# Patient Record
Sex: FEMALE | Race: WHITE | NOT HISPANIC OR LATINO | Employment: FULL TIME | URBAN - METROPOLITAN AREA
[De-identification: names, ages, dates, MRNs, and addresses within clinical notes are randomized per-mention and may not be internally consistent; named-entity substitution may affect disease eponyms.]

---

## 2021-03-15 DIAGNOSIS — Z23 NEED FOR VACCINATION: ICD-10-CM

## 2021-03-23 ENCOUNTER — HOSPITAL ENCOUNTER (OUTPATIENT)
Dept: LAB | Facility: MEDICAL CENTER | Age: 61
End: 2021-03-23
Attending: NURSE PRACTITIONER
Payer: COMMERCIAL

## 2021-03-23 ENCOUNTER — OFFICE VISIT (OUTPATIENT)
Dept: URGENT CARE | Facility: PHYSICIAN GROUP | Age: 61
End: 2021-03-23
Payer: COMMERCIAL

## 2021-03-23 ENCOUNTER — HOSPITAL ENCOUNTER (INPATIENT)
Facility: MEDICAL CENTER | Age: 61
LOS: 4 days | DRG: 897 | End: 2021-03-27
Attending: EMERGENCY MEDICINE | Admitting: HOSPITALIST
Payer: COMMERCIAL

## 2021-03-23 VITALS
RESPIRATION RATE: 18 BRPM | SYSTOLIC BLOOD PRESSURE: 116 MMHG | HEIGHT: 62 IN | DIASTOLIC BLOOD PRESSURE: 78 MMHG | WEIGHT: 135 LBS | BODY MASS INDEX: 24.84 KG/M2 | TEMPERATURE: 97.8 F | HEART RATE: 88 BPM | OXYGEN SATURATION: 93 %

## 2021-03-23 DIAGNOSIS — R11.0 NAUSEA: ICD-10-CM

## 2021-03-23 DIAGNOSIS — F10.931 ALCOHOL WITHDRAWAL DELIRIUM (HCC): ICD-10-CM

## 2021-03-23 DIAGNOSIS — K75.9 HEPATITIS: ICD-10-CM

## 2021-03-23 DIAGNOSIS — R10.13 EPIGASTRIC PAIN: ICD-10-CM

## 2021-03-23 DIAGNOSIS — R63.0 ANOREXIA: ICD-10-CM

## 2021-03-23 DIAGNOSIS — R19.7 DIARRHEA, UNSPECIFIED TYPE: ICD-10-CM

## 2021-03-23 DIAGNOSIS — R11.14 BILIOUS VOMITING WITH NAUSEA: ICD-10-CM

## 2021-03-23 DIAGNOSIS — R63.4 WEIGHT LOSS: ICD-10-CM

## 2021-03-23 DIAGNOSIS — D69.6 THROMBOCYTOPENIA (HCC): ICD-10-CM

## 2021-03-23 DIAGNOSIS — E87.1 HYPONATREMIA: ICD-10-CM

## 2021-03-23 DIAGNOSIS — R56.9 SEIZURE (HCC): ICD-10-CM

## 2021-03-23 PROBLEM — F10.10 ALCOHOL ABUSE: Status: ACTIVE | Noted: 2021-03-23

## 2021-03-23 PROBLEM — F10.930 SEIZURE DUE TO ALCOHOL WITHDRAWAL, UNCOMPLICATED (HCC): Status: ACTIVE | Noted: 2021-03-23

## 2021-03-23 PROBLEM — R11.2 NAUSEA VOMITING AND DIARRHEA: Status: ACTIVE | Noted: 2021-03-23

## 2021-03-23 LAB
ALBUMIN SERPL BCP-MCNC: 4 G/DL (ref 3.2–4.9)
ALBUMIN SERPL BCP-MCNC: 4.4 G/DL (ref 3.2–4.9)
ALBUMIN/GLOB SERPL: 1.2 G/DL
ALBUMIN/GLOB SERPL: 1.4 G/DL
ALP SERPL-CCNC: 113 U/L (ref 30–99)
ALP SERPL-CCNC: 119 U/L (ref 30–99)
ALT SERPL-CCNC: 65 U/L (ref 2–50)
ALT SERPL-CCNC: 66 U/L (ref 2–50)
AMYLASE SERPL-CCNC: 22 U/L (ref 20–103)
ANION GAP SERPL CALC-SCNC: 13 MMOL/L (ref 7–16)
ANION GAP SERPL CALC-SCNC: 21 MMOL/L (ref 7–16)
APTT PPP: 25.6 SEC (ref 24.7–36)
AST SERPL-CCNC: 153 U/L (ref 12–45)
AST SERPL-CCNC: 155 U/L (ref 12–45)
BASOPHILS # BLD AUTO: 0.5 % (ref 0–1.8)
BASOPHILS # BLD AUTO: 0.6 % (ref 0–1.8)
BASOPHILS # BLD: 0.02 K/UL (ref 0–0.12)
BASOPHILS # BLD: 0.02 K/UL (ref 0–0.12)
BILIRUB SERPL-MCNC: 2.3 MG/DL (ref 0.1–1.5)
BILIRUB SERPL-MCNC: 2.3 MG/DL (ref 0.1–1.5)
BUN SERPL-MCNC: 6 MG/DL (ref 8–22)
BUN SERPL-MCNC: 6 MG/DL (ref 8–22)
CALCIUM SERPL-MCNC: 9.2 MG/DL (ref 8.5–10.5)
CALCIUM SERPL-MCNC: 9.7 MG/DL (ref 8.5–10.5)
CHLORIDE SERPL-SCNC: 91 MMOL/L (ref 96–112)
CHLORIDE SERPL-SCNC: 92 MMOL/L (ref 96–112)
CO2 SERPL-SCNC: 22 MMOL/L (ref 20–33)
CO2 SERPL-SCNC: 28 MMOL/L (ref 20–33)
CREAT SERPL-MCNC: 0.49 MG/DL (ref 0.5–1.4)
CREAT SERPL-MCNC: 0.51 MG/DL (ref 0.5–1.4)
EOSINOPHIL # BLD AUTO: 0.04 K/UL (ref 0–0.51)
EOSINOPHIL # BLD AUTO: 0.04 K/UL (ref 0–0.51)
EOSINOPHIL NFR BLD: 0.9 % (ref 0–6.9)
EOSINOPHIL NFR BLD: 1.2 % (ref 0–6.9)
ERYTHROCYTE [DISTWIDTH] IN BLOOD BY AUTOMATED COUNT: 52.2 FL (ref 35.9–50)
ERYTHROCYTE [DISTWIDTH] IN BLOOD BY AUTOMATED COUNT: 52.5 FL (ref 35.9–50)
ETHANOL BLD-MCNC: <10.1 MG/DL (ref 0–10)
GLOBULIN SER CALC-MCNC: 3.1 G/DL (ref 1.9–3.5)
GLOBULIN SER CALC-MCNC: 3.3 G/DL (ref 1.9–3.5)
GLUCOSE BLD-MCNC: 105 MG/DL (ref 65–99)
GLUCOSE SERPL-MCNC: 115 MG/DL (ref 65–99)
GLUCOSE SERPL-MCNC: 91 MG/DL (ref 65–99)
HCT VFR BLD AUTO: 44.7 % (ref 37–47)
HCT VFR BLD AUTO: 45.9 % (ref 37–47)
HGB BLD-MCNC: 15 G/DL (ref 12–16)
HGB BLD-MCNC: 15.4 G/DL (ref 12–16)
IMM GRANULOCYTES # BLD AUTO: 0.01 K/UL (ref 0–0.11)
IMM GRANULOCYTES # BLD AUTO: 0.01 K/UL (ref 0–0.11)
IMM GRANULOCYTES NFR BLD AUTO: 0.2 % (ref 0–0.9)
IMM GRANULOCYTES NFR BLD AUTO: 0.3 % (ref 0–0.9)
INR PPP: 1.03 (ref 0.87–1.13)
LIPASE SERPL-CCNC: 19 U/L (ref 11–82)
LIPASE SERPL-CCNC: 19 U/L (ref 11–82)
LYMPHOCYTES # BLD AUTO: 0.54 K/UL (ref 1–4.8)
LYMPHOCYTES # BLD AUTO: 0.89 K/UL (ref 1–4.8)
LYMPHOCYTES NFR BLD: 16.4 % (ref 22–41)
LYMPHOCYTES NFR BLD: 20.5 % (ref 22–41)
MCH RBC QN AUTO: 37.2 PG (ref 27–33)
MCH RBC QN AUTO: 37.8 PG (ref 27–33)
MCHC RBC AUTO-ENTMCNC: 33.6 G/DL (ref 33.6–35)
MCHC RBC AUTO-ENTMCNC: 33.6 G/DL (ref 33.6–35)
MCV RBC AUTO: 110.9 FL (ref 81.4–97.8)
MCV RBC AUTO: 112.6 FL (ref 81.4–97.8)
MONOCYTES # BLD AUTO: 0.4 K/UL (ref 0–0.85)
MONOCYTES # BLD AUTO: 0.49 K/UL (ref 0–0.85)
MONOCYTES NFR BLD AUTO: 11.3 % (ref 0–13.4)
MONOCYTES NFR BLD AUTO: 12.1 % (ref 0–13.4)
NEUTROPHILS # BLD AUTO: 2.29 K/UL (ref 2–7.15)
NEUTROPHILS # BLD AUTO: 2.89 K/UL (ref 2–7.15)
NEUTROPHILS NFR BLD: 66.6 % (ref 44–72)
NEUTROPHILS NFR BLD: 69.4 % (ref 44–72)
NRBC # BLD AUTO: 0 K/UL
NRBC # BLD AUTO: 0 K/UL
NRBC BLD-RTO: 0 /100 WBC
NRBC BLD-RTO: 0 /100 WBC
PLATELET # BLD AUTO: 89 K/UL (ref 164–446)
PLATELET # BLD AUTO: 90 K/UL (ref 164–446)
PMV BLD AUTO: 9.7 FL (ref 9–12.9)
PMV BLD AUTO: 9.8 FL (ref 9–12.9)
POTASSIUM SERPL-SCNC: 3.7 MMOL/L (ref 3.6–5.5)
POTASSIUM SERPL-SCNC: 3.8 MMOL/L (ref 3.6–5.5)
PROT SERPL-MCNC: 7.3 G/DL (ref 6–8.2)
PROT SERPL-MCNC: 7.5 G/DL (ref 6–8.2)
PROTHROMBIN TIME: 13.9 SEC (ref 12–14.6)
RBC # BLD AUTO: 3.97 M/UL (ref 4.2–5.4)
RBC # BLD AUTO: 4.14 M/UL (ref 4.2–5.4)
SARS-COV-2 RNA RESP QL NAA+PROBE: NOTDETECTED
SODIUM SERPL-SCNC: 132 MMOL/L (ref 135–145)
SODIUM SERPL-SCNC: 135 MMOL/L (ref 135–145)
SPECIMEN SOURCE: NORMAL
T4 FREE SERPL-MCNC: 1.01 NG/DL (ref 0.93–1.7)
TSH SERPL DL<=0.005 MIU/L-ACNC: 9.67 UIU/ML (ref 0.38–5.33)
WBC # BLD AUTO: 3.3 K/UL (ref 4.8–10.8)
WBC # BLD AUTO: 4.3 K/UL (ref 4.8–10.8)

## 2021-03-23 PROCEDURE — 700101 HCHG RX REV CODE 250: Performed by: HOSPITALIST

## 2021-03-23 PROCEDURE — 83690 ASSAY OF LIPASE: CPT

## 2021-03-23 PROCEDURE — 84443 ASSAY THYROID STIM HORMONE: CPT

## 2021-03-23 PROCEDURE — 80053 COMPREHEN METABOLIC PANEL: CPT

## 2021-03-23 PROCEDURE — 99223 1ST HOSP IP/OBS HIGH 75: CPT | Performed by: HOSPITALIST

## 2021-03-23 PROCEDURE — 700111 HCHG RX REV CODE 636 W/ 250 OVERRIDE (IP): Performed by: EMERGENCY MEDICINE

## 2021-03-23 PROCEDURE — 82746 ASSAY OF FOLIC ACID SERUM: CPT

## 2021-03-23 PROCEDURE — 80053 COMPREHEN METABOLIC PANEL: CPT | Mod: 91

## 2021-03-23 PROCEDURE — 36415 COLL VENOUS BLD VENIPUNCTURE: CPT

## 2021-03-23 PROCEDURE — 99285 EMERGENCY DEPT VISIT HI MDM: CPT

## 2021-03-23 PROCEDURE — 85025 COMPLETE CBC W/AUTO DIFF WBC: CPT

## 2021-03-23 PROCEDURE — 85025 COMPLETE CBC W/AUTO DIFF WBC: CPT | Mod: 91

## 2021-03-23 PROCEDURE — 96368 THER/DIAG CONCURRENT INF: CPT

## 2021-03-23 PROCEDURE — 96365 THER/PROPH/DIAG IV INF INIT: CPT

## 2021-03-23 PROCEDURE — 700105 HCHG RX REV CODE 258: Performed by: EMERGENCY MEDICINE

## 2021-03-23 PROCEDURE — 99215 OFFICE O/P EST HI 40 MIN: CPT | Performed by: NURSE PRACTITIONER

## 2021-03-23 PROCEDURE — 85610 PROTHROMBIN TIME: CPT

## 2021-03-23 PROCEDURE — 83690 ASSAY OF LIPASE: CPT | Mod: 91

## 2021-03-23 PROCEDURE — 770006 HCHG ROOM/CARE - MED/SURG/GYN SEMI*

## 2021-03-23 PROCEDURE — 84439 ASSAY OF FREE THYROXINE: CPT

## 2021-03-23 PROCEDURE — 82607 VITAMIN B-12: CPT

## 2021-03-23 PROCEDURE — 700102 HCHG RX REV CODE 250 W/ 637 OVERRIDE(OP): Performed by: HOSPITALIST

## 2021-03-23 PROCEDURE — 82150 ASSAY OF AMYLASE: CPT

## 2021-03-23 PROCEDURE — U0005 INFEC AGEN DETEC AMPLI PROBE: HCPCS

## 2021-03-23 PROCEDURE — A9270 NON-COVERED ITEM OR SERVICE: HCPCS | Performed by: HOSPITALIST

## 2021-03-23 PROCEDURE — 700105 HCHG RX REV CODE 258: Performed by: HOSPITALIST

## 2021-03-23 PROCEDURE — 85730 THROMBOPLASTIN TIME PARTIAL: CPT

## 2021-03-23 PROCEDURE — 96372 THER/PROPH/DIAG INJ SC/IM: CPT

## 2021-03-23 PROCEDURE — 82962 GLUCOSE BLOOD TEST: CPT

## 2021-03-23 PROCEDURE — U0003 INFECTIOUS AGENT DETECTION BY NUCLEIC ACID (DNA OR RNA); SEVERE ACUTE RESPIRATORY SYNDROME CORONAVIRUS 2 (SARS-COV-2) (CORONAVIRUS DISEASE [COVID-19]), AMPLIFIED PROBE TECHNIQUE, MAKING USE OF HIGH THROUGHPUT TECHNOLOGIES AS DESCRIBED BY CMS-2020-01-R: HCPCS

## 2021-03-23 PROCEDURE — 700111 HCHG RX REV CODE 636 W/ 250 OVERRIDE (IP): Performed by: HOSPITALIST

## 2021-03-23 PROCEDURE — 80307 DRUG TEST PRSMV CHEM ANLYZR: CPT

## 2021-03-23 PROCEDURE — 82077 ASSAY SPEC XCP UR&BREATH IA: CPT

## 2021-03-23 PROCEDURE — 96375 TX/PRO/DX INJ NEW DRUG ADDON: CPT

## 2021-03-23 RX ORDER — LORAZEPAM 2 MG/ML
0.5 INJECTION INTRAMUSCULAR EVERY 4 HOURS PRN
Status: DISCONTINUED | OUTPATIENT
Start: 2021-03-23 | End: 2021-03-27 | Stop reason: HOSPADM

## 2021-03-23 RX ORDER — SODIUM CHLORIDE, SODIUM LACTATE, POTASSIUM CHLORIDE, CALCIUM CHLORIDE 600; 310; 30; 20 MG/100ML; MG/100ML; MG/100ML; MG/100ML
2000 INJECTION, SOLUTION INTRAVENOUS ONCE
Status: COMPLETED | OUTPATIENT
Start: 2021-03-23 | End: 2021-03-23

## 2021-03-23 RX ORDER — LORAZEPAM 0.5 MG/1
0.5 TABLET ORAL EVERY 4 HOURS PRN
Status: DISCONTINUED | OUTPATIENT
Start: 2021-03-23 | End: 2021-03-27 | Stop reason: HOSPADM

## 2021-03-23 RX ORDER — LORAZEPAM 2 MG/ML
2 INJECTION INTRAMUSCULAR
Status: DISCONTINUED | OUTPATIENT
Start: 2021-03-23 | End: 2021-03-27 | Stop reason: HOSPADM

## 2021-03-23 RX ORDER — LORAZEPAM 2 MG/1
2 TABLET ORAL
Status: DISCONTINUED | OUTPATIENT
Start: 2021-03-23 | End: 2021-03-27 | Stop reason: HOSPADM

## 2021-03-23 RX ORDER — POLYETHYLENE GLYCOL 3350 17 G/17G
1 POWDER, FOR SOLUTION ORAL
Status: DISCONTINUED | OUTPATIENT
Start: 2021-03-23 | End: 2021-03-27 | Stop reason: HOSPADM

## 2021-03-23 RX ORDER — LORAZEPAM 2 MG/ML
1.5 INJECTION INTRAMUSCULAR
Status: DISCONTINUED | OUTPATIENT
Start: 2021-03-23 | End: 2021-03-27 | Stop reason: HOSPADM

## 2021-03-23 RX ORDER — AMOXICILLIN 250 MG
2 CAPSULE ORAL 2 TIMES DAILY
Status: DISCONTINUED | OUTPATIENT
Start: 2021-03-23 | End: 2021-03-27 | Stop reason: HOSPADM

## 2021-03-23 RX ORDER — PROMETHAZINE HYDROCHLORIDE 25 MG/1
12.5-25 TABLET ORAL EVERY 4 HOURS PRN
Status: DISCONTINUED | OUTPATIENT
Start: 2021-03-23 | End: 2021-03-27 | Stop reason: HOSPADM

## 2021-03-23 RX ORDER — ONDANSETRON 4 MG/1
4 TABLET, ORALLY DISINTEGRATING ORAL EVERY 6 HOURS PRN
Qty: 12 TABLET | Refills: 0 | Status: ON HOLD | OUTPATIENT
Start: 2021-03-23 | End: 2021-03-27

## 2021-03-23 RX ORDER — CHLORDIAZEPOXIDE HYDROCHLORIDE 25 MG/1
25 CAPSULE, GELATIN COATED ORAL EVERY 8 HOURS
Status: COMPLETED | OUTPATIENT
Start: 2021-03-23 | End: 2021-03-24

## 2021-03-23 RX ORDER — PROCHLORPERAZINE EDISYLATE 5 MG/ML
5-10 INJECTION INTRAMUSCULAR; INTRAVENOUS EVERY 4 HOURS PRN
Status: DISCONTINUED | OUTPATIENT
Start: 2021-03-23 | End: 2021-03-27 | Stop reason: HOSPADM

## 2021-03-23 RX ORDER — ONDANSETRON 4 MG/1
4 TABLET, ORALLY DISINTEGRATING ORAL EVERY 4 HOURS PRN
Status: DISCONTINUED | OUTPATIENT
Start: 2021-03-23 | End: 2021-03-27 | Stop reason: HOSPADM

## 2021-03-23 RX ORDER — ONDANSETRON 2 MG/ML
4 INJECTION INTRAMUSCULAR; INTRAVENOUS EVERY 4 HOURS PRN
Status: DISCONTINUED | OUTPATIENT
Start: 2021-03-23 | End: 2021-03-27 | Stop reason: HOSPADM

## 2021-03-23 RX ORDER — LORAZEPAM 1 MG/1
1 TABLET ORAL EVERY 4 HOURS PRN
Status: DISCONTINUED | OUTPATIENT
Start: 2021-03-23 | End: 2021-03-27 | Stop reason: HOSPADM

## 2021-03-23 RX ORDER — LORAZEPAM 2 MG/ML
1 INJECTION INTRAMUSCULAR ONCE
Status: COMPLETED | OUTPATIENT
Start: 2021-03-23 | End: 2021-03-23

## 2021-03-23 RX ORDER — CLONIDINE HYDROCHLORIDE 0.1 MG/1
0.1 TABLET ORAL EVERY 6 HOURS PRN
Status: DISCONTINUED | OUTPATIENT
Start: 2021-03-23 | End: 2021-03-27 | Stop reason: HOSPADM

## 2021-03-23 RX ORDER — GAUZE BANDAGE 2" X 2"
100 BANDAGE TOPICAL DAILY
Status: COMPLETED | OUTPATIENT
Start: 2021-03-24 | End: 2021-03-27

## 2021-03-23 RX ORDER — FOLIC ACID 1 MG/1
1 TABLET ORAL DAILY
Status: COMPLETED | OUTPATIENT
Start: 2021-03-24 | End: 2021-03-27

## 2021-03-23 RX ORDER — SODIUM CHLORIDE 9 MG/ML
INJECTION, SOLUTION INTRAVENOUS CONTINUOUS
Status: DISCONTINUED | OUTPATIENT
Start: 2021-03-23 | End: 2021-03-27 | Stop reason: HOSPADM

## 2021-03-23 RX ORDER — POTASSIUM CHLORIDE 20 MEQ/1
20 TABLET, EXTENDED RELEASE ORAL 2 TIMES DAILY
Status: DISCONTINUED | OUTPATIENT
Start: 2021-03-23 | End: 2021-03-27 | Stop reason: HOSPADM

## 2021-03-23 RX ORDER — ACETAMINOPHEN 325 MG/1
650 TABLET ORAL EVERY 6 HOURS PRN
Status: DISCONTINUED | OUTPATIENT
Start: 2021-03-23 | End: 2021-03-27 | Stop reason: HOSPADM

## 2021-03-23 RX ORDER — PROMETHAZINE HYDROCHLORIDE 25 MG/1
12.5-25 SUPPOSITORY RECTAL EVERY 4 HOURS PRN
Status: DISCONTINUED | OUTPATIENT
Start: 2021-03-23 | End: 2021-03-27 | Stop reason: HOSPADM

## 2021-03-23 RX ORDER — LORAZEPAM 2 MG/ML
1 INJECTION INTRAMUSCULAR
Status: DISCONTINUED | OUTPATIENT
Start: 2021-03-23 | End: 2021-03-27 | Stop reason: HOSPADM

## 2021-03-23 RX ORDER — BISACODYL 10 MG
10 SUPPOSITORY, RECTAL RECTAL
Status: DISCONTINUED | OUTPATIENT
Start: 2021-03-23 | End: 2021-03-27 | Stop reason: HOSPADM

## 2021-03-23 RX ORDER — ONDANSETRON 4 MG/1
4 TABLET, ORALLY DISINTEGRATING ORAL ONCE
Status: COMPLETED | OUTPATIENT
Start: 2021-03-23 | End: 2021-03-23

## 2021-03-23 RX ORDER — LABETALOL HYDROCHLORIDE 5 MG/ML
10 INJECTION, SOLUTION INTRAVENOUS EVERY 4 HOURS PRN
Status: DISCONTINUED | OUTPATIENT
Start: 2021-03-23 | End: 2021-03-27 | Stop reason: HOSPADM

## 2021-03-23 RX ORDER — LORAZEPAM 2 MG/1
4 TABLET ORAL
Status: DISCONTINUED | OUTPATIENT
Start: 2021-03-23 | End: 2021-03-27 | Stop reason: HOSPADM

## 2021-03-23 RX ORDER — MAGNESIUM SULFATE HEPTAHYDRATE 40 MG/ML
2 INJECTION, SOLUTION INTRAVENOUS ONCE
Status: COMPLETED | OUTPATIENT
Start: 2021-03-23 | End: 2021-03-23

## 2021-03-23 RX ADMIN — THIAMINE HYDROCHLORIDE: 100 INJECTION, SOLUTION INTRAMUSCULAR; INTRAVENOUS at 17:52

## 2021-03-23 RX ADMIN — CHLORDIAZEPOXIDE HYDROCHLORIDE 25 MG: 25 CAPSULE ORAL at 17:30

## 2021-03-23 RX ADMIN — LORAZEPAM 1 MG: 2 INJECTION INTRAMUSCULAR; INTRAVENOUS at 15:43

## 2021-03-23 RX ADMIN — MAGNESIUM SULFATE 2 G: 2 INJECTION INTRAVENOUS at 17:53

## 2021-03-23 RX ADMIN — ENOXAPARIN SODIUM 40 MG: 40 INJECTION SUBCUTANEOUS at 17:30

## 2021-03-23 RX ADMIN — SODIUM CHLORIDE, POTASSIUM CHLORIDE, SODIUM LACTATE AND CALCIUM CHLORIDE 2000 ML: 600; 310; 30; 20 INJECTION, SOLUTION INTRAVENOUS at 23:00

## 2021-03-23 RX ADMIN — POTASSIUM CHLORIDE 20 MEQ: 1500 TABLET, EXTENDED RELEASE ORAL at 17:51

## 2021-03-23 RX ADMIN — ONDANSETRON 4 MG: 4 TABLET, ORALLY DISINTEGRATING ORAL at 10:15

## 2021-03-23 RX ADMIN — SODIUM CHLORIDE: 9 INJECTION, SOLUTION INTRAVENOUS at 15:48

## 2021-03-23 ASSESSMENT — ENCOUNTER SYMPTOMS
NAUSEA: 1
SORE THROAT: 0
BLOOD IN STOOL: 0
ABDOMINAL PAIN: 1
FEVER: 0
SHORTNESS OF BREATH: 0
NERVOUS/ANXIOUS: 0
FATIGUE: 1
SENSORY CHANGE: 0
DIZZINESS: 0
DEPRESSION: 0
ANOREXIA: 1
EYE PAIN: 0
CONSTIPATION: 1
VOMITING: 1
DIARRHEA: 1
DIARRHEA: 1
COUGH: 0
WEAKNESS: 1
SHORTNESS OF BREATH: 0
NAUSEA: 1
WEIGHT LOSS: 1
ORTHOPNEA: 0
DIZZINESS: 0
SPEECH CHANGE: 0
EYE DISCHARGE: 0
ABDOMINAL PAIN: 1
NERVOUS/ANXIOUS: 0
CHILLS: 0
HEADACHES: 0
FEVER: 0
HEARTBURN: 0
COUGH: 0
PALPITATIONS: 0
VOMITING: 1
CHILLS: 1
MYALGIAS: 0
HEADACHES: 0
STRIDOR: 0
CHANGE IN BOWEL HABIT: 1

## 2021-03-23 ASSESSMENT — COGNITIVE AND FUNCTIONAL STATUS - GENERAL
SUGGESTED CMS G CODE MODIFIER DAILY ACTIVITY: CH
DAILY ACTIVITIY SCORE: 24
MOBILITY SCORE: 24
SUGGESTED CMS G CODE MODIFIER MOBILITY: CH

## 2021-03-23 ASSESSMENT — LIFESTYLE VARIABLES
HOW MANY TIMES IN THE PAST YEAR HAVE YOU HAD 5 OR MORE DRINKS IN A DAY: 4
AVERAGE NUMBER OF DAYS PER WEEK YOU HAVE A DRINK CONTAINING ALCOHOL: 7
TOTAL SCORE: 1
TOTAL SCORE: 2
ANXIETY: NO ANXIETY (AT EASE)
EVER FELT BAD OR GUILTY ABOUT YOUR DRINKING: NO
DOES PATIENT WANT TO TALK TO SOMEONE ABOUT QUITTING: NO
EVER HAD A DRINK FIRST THING IN THE MORNING TO STEADY YOUR NERVES TO GET RID OF A HANGOVER: YES
TOTAL SCORE: 2
NAUSEA AND VOMITING: NO NAUSEA AND NO VOMITING
AUDITORY DISTURBANCES: NOT PRESENT
TOTAL SCORE: 2
ON A TYPICAL DAY WHEN YOU DRINK ALCOHOL HOW MANY DRINKS DO YOU HAVE: 3
PAROXYSMAL SWEATS: NO SWEAT VISIBLE
DO YOU DRINK ALCOHOL: YES
EVER FELT BAD OR GUILTY ABOUT YOUR DRINKING: NO
HOW MANY TIMES IN THE PAST YEAR HAVE YOU HAD 5 OR MORE DRINKS IN A DAY: 100
TOTAL SCORE: VERY MILD ITCHING, PINS AND NEEDLES SENSATION, BURNING OR NUMBNESS
TREMOR: TREMOR NOT VISIBLE BUT CAN BE FELT, FINGERTIP TO FINGERTIP
HAVE YOU EVER FELT YOU SHOULD CUT DOWN ON YOUR DRINKING: YES
HEADACHE, FULLNESS IN HEAD: NOT PRESENT
SUBSTANCE_ABUSE: 1
HEADACHE, FULLNESS IN HEAD: NOT PRESENT
EVER HAD A DRINK FIRST THING IN THE MORNING TO STEADY YOUR NERVES TO GET RID OF A HANGOVER: YES
AGITATION: NORMAL ACTIVITY
TOTAL SCORE: 2
TOTAL SCORE: 2
CONSUMPTION TOTAL: POSITIVE
ORIENTATION AND CLOUDING OF SENSORIUM: ORIENTED AND CAN DO SERIAL ADDITIONS
TOTAL SCORE: 2
TREMOR: NO TREMOR
AUDITORY DISTURBANCES: NOT PRESENT
AVERAGE NUMBER OF DAYS PER WEEK YOU HAVE A DRINK CONTAINING ALCOHOL: 4
CONSUMPTION TOTAL: POSITIVE
ON A TYPICAL DAY WHEN YOU DRINK ALCOHOL HOW MANY DRINKS DO YOU HAVE: 5
ANXIETY: MILDLY ANXIOUS
NAUSEA AND VOMITING: NO NAUSEA AND NO VOMITING
TOTAL SCORE: 2
HAVE PEOPLE ANNOYED YOU BY CRITICIZING YOUR DRINKING: NO
DOES PATIENT WANT TO STOP DRINKING: YES
VISUAL DISTURBANCES: NOT PRESENT
PAROXYSMAL SWEATS: NO SWEAT VISIBLE
AGITATION: NORMAL ACTIVITY
ORIENTATION AND CLOUDING OF SENSORIUM: ORIENTED AND CAN DO SERIAL ADDITIONS
HAVE PEOPLE ANNOYED YOU BY CRITICIZING YOUR DRINKING: NO
HAVE YOU EVER FELT YOU SHOULD CUT DOWN ON YOUR DRINKING: YES
VISUAL DISTURBANCES: NOT PRESENT
ALCOHOL_USE: YES

## 2021-03-23 ASSESSMENT — PAIN DESCRIPTION - PAIN TYPE
TYPE: ACUTE PAIN
TYPE: ACUTE PAIN

## 2021-03-23 ASSESSMENT — PATIENT HEALTH QUESTIONNAIRE - PHQ9
SUM OF ALL RESPONSES TO PHQ9 QUESTIONS 1 AND 2: 0
2. FEELING DOWN, DEPRESSED, IRRITABLE, OR HOPELESS: NOT AT ALL
1. LITTLE INTEREST OR PLEASURE IN DOING THINGS: NOT AT ALL

## 2021-03-23 ASSESSMENT — FIBROSIS 4 INDEX: FIB4 SCORE: 12.53

## 2021-03-23 NOTE — ED NOTES
Pt began seizing in lobby. approx 30 sec. Pt turned onto side, nonrebreather applied, and then assisted [place].   Charge RN notified.   Pt to room 11. Report to Mary ZHANG.

## 2021-03-23 NOTE — ED NOTES
Med Rec complete per pt  Allergies Reviewed  No ABX in the last 14 days     Confirmed Pt no longer taking LEVOTHYROXINE

## 2021-03-23 NOTE — PROGRESS NOTES
Subjective:   Marilynn Mary is a 60 y.o. female who presents for Fatigue (no appetite, fatigue, not feeling well after covid vaccine. got vaccine 3/12/21.  )       Nausea  This is a new problem. The current episode started 1 to 4 weeks ago. The problem occurs constantly. Associated symptoms include abdominal pain, anorexia, a change in bowel habit, chills, fatigue, nausea, vomiting and weakness. Pertinent negatives include no chest pain, congestion, coughing, fever, headaches, myalgias, rash or sore throat. The symptoms are aggravated by drinking, eating and exertion. She has tried position changes (small meals/ soft foods) for the symptoms. The treatment provided no relief.     Pt presents for evaluation of a new problem, reports a week history of constant nausea with vomiting, occasional episodes of diarrhea, fatigue, malaise, and weight loss.  Patient states this began approximately the same time that she had significant dental work completed.  Patient's states that she had 13 teeth extractions on 3/12, and was on 10 days of amoxicillin at that time, which she completed as directed.  Since that time, she has followed-up with her dentist, and continues to have several lower teeth that need to be extracted as well, however patient is unable to afford it.  She has tita struggling to get her upper denture plate to fit correctly.  She has been on a liquid and soft food diet due to this.    Patient states that every time she tries to eat anything, she vomits shortly thereafter.  She is unable to keep down any type of liquids, including water.  Patient is noted to be shaking during visit, she states that she is cold as well as having roughly 1.5 weeks without able to maintain any food or liquids, and feels weak due to this.  Patient has a history of alcoholism (2012), she states that she typically drinks 2 glasses of wine per night, and is currently not drinking.  Her last drink was 3 days ago when her nausea increased  "significantly.  Patient states that her last bowel movement was 2 days ago, has a history of chronic constipation.  Over the past 1.5 weeks, patient has had intermittent diarrhea.  Patient denies fever, chills, or known ill contacts.    Review of Systems   Constitutional: Positive for chills, fatigue, malaise/fatigue and weight loss. Negative for fever.   HENT: Negative for congestion, ear pain and sore throat.    Eyes: Negative for pain.   Respiratory: Negative for cough and shortness of breath.    Cardiovascular: Negative for chest pain, orthopnea and leg swelling.   Gastrointestinal: Positive for abdominal pain, anorexia, change in bowel habit, constipation, diarrhea, nausea and vomiting. Negative for heartburn.   Genitourinary: Negative for dysuria, frequency, hematuria and urgency.   Musculoskeletal: Negative for myalgias.   Skin: Negative for rash.   Neurological: Positive for weakness. Negative for dizziness and headaches.   Psychiatric/Behavioral: The patient is not nervous/anxious.    All other systems reviewed and are negative.      MEDS:   Current Outpatient Medications:   •  pregabalin (LYRICA) 75 MG CAPS, Take 75 mg by mouth 3 times a day., Disp: , Rfl:   •  lorazepam (ATIVAN) 1 MG TABS, Take 1-2 mg by mouth every 8 hours as needed. Indications: Feeling Anxious, Disp: , Rfl:   •  ondansetron (ZOFRAN ODT) 4 MG TBDP, Take 4 mg by mouth every 8 hours as needed. Indications: Nausea and Vomiting, Disp: , Rfl:   •  levothyroxine (SYNTHROID) 100 MCG TABS, Take 150 mcg by mouth every morning before breakfast., Disp: , Rfl:   ALLERGIES: No Known Allergies    Patient's PMH, SocHx, SurgHx, FamHx, Drug allergies and medications were reviewed.     Objective:   /78   Pulse 88   Temp 36.6 °C (97.8 °F) (Temporal)   Resp 18   Ht 1.575 m (5' 2\")   Wt 61.2 kg (135 lb)   SpO2 93%   BMI 24.69 kg/m²     Physical Exam  Vitals and nursing note reviewed.   Constitutional:       General: She is awake.      " Appearance: Normal appearance. She is well-developed and normal weight. She is ill-appearing (Appears uncomfortable).      Comments: Patient is noted to be shaking during visit.   HENT:      Head: Normocephalic and atraumatic.      Right Ear: Tympanic membrane, ear canal and external ear normal.      Left Ear: Tympanic membrane, ear canal and external ear normal.      Nose: Nose normal.      Mouth/Throat:      Mouth: Mucous membranes are moist.      Pharynx: Oropharynx is clear.   Eyes:      General: Lids are normal. Scleral icterus present.      Extraocular Movements: Extraocular movements intact.      Conjunctiva/sclera: Conjunctivae normal.      Pupils: Pupils are equal, round, and reactive to light.   Neck:      Thyroid: No thyromegaly.      Trachea: Trachea normal.   Cardiovascular:      Rate and Rhythm: Normal rate and regular rhythm.      Pulses: Normal pulses.      Heart sounds: Normal heart sounds, S1 normal and S2 normal.   Pulmonary:      Effort: Pulmonary effort is normal. No respiratory distress.      Breath sounds: Normal breath sounds. No wheezing, rhonchi or rales.   Abdominal:      General: Abdomen is flat. Bowel sounds are decreased.      Palpations: Abdomen is soft.      Tenderness: There is abdominal tenderness in the epigastric area and left upper quadrant. There is no right CVA tenderness, left CVA tenderness, guarding or rebound.   Musculoskeletal:         General: Normal range of motion.      Cervical back: Full passive range of motion without pain, normal range of motion and neck supple.   Lymphadenopathy:      Cervical: No cervical adenopathy.   Skin:     General: Skin is warm and dry.      Capillary Refill: Capillary refill takes less than 2 seconds.   Neurological:      General: No focal deficit present.      Mental Status: She is alert and oriented to person, place, and time.      Gait: Gait abnormal.      Comments: Patient has slow gait, uses  for support.  Per patient report,  she has neuropathy and this is unchanged for her.  She states that she feels slightly more weak due to being ill over the past 1.5 weeks   Psychiatric:         Attention and Perception: Attention and perception normal.         Mood and Affect: Mood normal.         Speech: Speech normal.         Behavior: Behavior normal. Behavior is cooperative.         Thought Content: Thought content normal.         Judgment: Judgment normal.         Assessment/Plan:   Assessment      1. Hepatitis    2. Hyponatremia    3. Thrombocytopenia (HCC)    4. Bilious vomiting with nausea  - CBC WITH DIFFERENTIAL; Future  - Comp Metabolic Panel; Future  - AMYLASE; Future  - LIPASE; Future  - ondansetron (ZOFRAN ODT) dispertab 4 mg  - ondansetron (ZOFRAN ODT) 4 MG TABLET DISPERSIBLE; Take 1 tablet by mouth every 6 hours as needed for Nausea for up to 12 doses.  Dispense: 12 tablet; Refill: 0    5. Weight loss  - CBC WITH DIFFERENTIAL; Future  - Comp Metabolic Panel; Future  - AMYLASE; Future  - LIPASE; Future    6. Nausea  - CBC WITH DIFFERENTIAL; Future  - Comp Metabolic Panel; Future  - AMYLASE; Future  - LIPASE; Future  - ondansetron (ZOFRAN ODT) dispertab 4 mg  - ondansetron (ZOFRAN ODT) 4 MG TABLET DISPERSIBLE; Take 1 tablet by mouth every 6 hours as needed for Nausea for up to 12 doses.  Dispense: 12 tablet; Refill: 0    7. Epigastric pain  - CBC WITH DIFFERENTIAL; Future  - Comp Metabolic Panel; Future  - AMYLASE; Future  - LIPASE; Future    8. Anorexia  - CBC WITH DIFFERENTIAL; Future  - Comp Metabolic Panel; Future  - AMYLASE; Future  - LIPASE; Future    9. Diarrhea, unspecified type  - CBC WITH DIFFERENTIAL; Future  - Comp Metabolic Panel; Future  - AMYLASE; Future  - LIPASE; Future    Vital signs stable at today's acute urgent care visit. Given zofran in clinic as listed, patient felt mild relief of nausea.  I am concerned about her prolonged symptoms.  Ordered labs as listed.  Initially ordered abdominal CT, however due to  patient's insurance, required her to go to [facility].  Called [facility] to schedule a stat appointment, the next availability is not until 4 days from now.  I discussed with patient and her  that pending results of labs, I will call them with results, as they prefer to not to to the ED just yet.  I told them that there was a high probability that they will need to go to the ED for further evaluation and higher level of care.  They understand and agree to this. Discussed management options (risks, benefits, and alternatives to treatment).     *Post visit, same day: Reviewed the labs, and it is very concerning.  I called the patient and her  and discussed with them the findings and directed them to go to the ED for further evaluation and higher level of care.  All questions answered, and they state understanding to this.  Transfer center called and report given.     I personally reviewed prior external notes and test results pertinent to today's visit.  I have independently reviewed and interpreted all diagnostics ordered during this urgent care acute visit. Time spent evaluating this patient was a minimum of 30 minutes and includes preparing for visit, counseling/education, exam and evaluation, obtaining history, independent interpretation and ordering lab/test/procedures.      Please note that this dictation was created using voice recognition software. I have made a reasonable attempt to correct obvious errors, but I expect that there are errors of grammar and possibly content that I did not discover before finalizing the note.

## 2021-03-23 NOTE — ED TRIAGE NOTES
"Chief Complaint   Patient presents with   • Sent from Urgent Care     seen there this morning. had lab work done, states she is dehydrated and considered she has hepatitis.    • Nausea/Vomiting/Diarrhea     x3-4 days.   • Abdominal Pain     upper abd pain x3-4 days.    • Back Pain     low back pain x3-4 days.    • Abnormal Labs     elevated liver enzymes.   • Alcohol Problem     last drink 3 days ago.      Pt ambulatory to triage for above. Reports hx of alcohol abuse. Was sober until last year. Has been drinking 2-3 glasses of wine per night.   15 years ago she had withdrawal sz. None recently.   Pt noted to be tremulous.   BS: 105.     /68   Pulse 77   Temp 36.9 °C (98.4 °F) (Temporal)   Resp 16   Ht 1.575 m (5' 2\")   Wt 62.8 kg (138 lb 7.2 oz)   SpO2 97%   BMI 25.32 kg/m²     "

## 2021-03-23 NOTE — ED PROVIDER NOTES
ED Provider Note    CHIEF COMPLAINT  Chief Complaint   Patient presents with   • Sent from Urgent Care     seen there this morning. had lab work done, states she is dehydrated and considered she has hepatitis.    • Nausea/Vomiting/Diarrhea     x3-4 days.   • Abdominal Pain     upper abd pain x3-4 days.    • Back Pain     low back pain x3-4 days.    • Abnormal Labs     elevated liver enzymes.   • Alcohol Problem     last drink 3 days ago.        HPI  Marilynn Mary is a 60 y.o. female who presents with vomiting and diarrhea.  According to the significant other the patient was seen this morning at the urgent care for vomiting diarrhea and suspected dehydration.  The patient was found to have a hepatitis suspected to be from alcohol induced hepatitis.  The patient subsequently transferred here via private car and did have a seizure while in triage.  She states her last drink was 3 days ago.   cannot confirm this.  The patient did bite her tongue and was postictal my initial exam.  Most the history was from the .  He states that she has having difficulty eating for about a month.  She had some teeth pulled and she is attempting get dentures and subsequently became ill after the Covid vaccination.  So she has not ate well in the last month.  He states she started developing diarrhea with abdominal pain and vomiting over the last 3 to 4 days.    REVIEW OF SYSTEMS  See HPI for further details. All other systems are negative.     PAST MEDICAL HISTORY  Past Medical History:   Diagnosis Date   • Alcohol abuse        FAMILY HISTORY  [unfilled]    SOCIAL HISTORY  Social History     Socioeconomic History   • Marital status:      Spouse name: Not on file   • Number of children: Not on file   • Years of education: Not on file   • Highest education level: Not on file   Occupational History   • Not on file   Tobacco Use   • Smoking status: Never Smoker   • Smokeless tobacco: Never Used   Substance and Sexual  "Activity   • Alcohol use: Yes     Alcohol/week: 1.2 - 1.8 oz     Types: 2 - 3 Glasses of wine per week   • Drug use: Yes     Comment: occ   • Sexual activity: Yes     Birth control/protection: Post-Menopausal   Other Topics Concern   • Not on file   Social History Narrative   • Not on file     Social Determinants of Health     Financial Resource Strain:    • Difficulty of Paying Living Expenses:    Food Insecurity:    • Worried About Running Out of Food in the Last Year:    • Ran Out of Food in the Last Year:    Transportation Needs:    • Lack of Transportation (Medical):    • Lack of Transportation (Non-Medical):    Physical Activity:    • Days of Exercise per Week:    • Minutes of Exercise per Session:    Stress:    • Feeling of Stress :    Social Connections:    • Frequency of Communication with Friends and Family:    • Frequency of Social Gatherings with Friends and Family:    • Attends Jainism Services:    • Active Member of Clubs or Organizations:    • Attends Club or Organization Meetings:    • Marital Status:    Intimate Partner Violence:    • Fear of Current or Ex-Partner:    • Emotionally Abused:    • Physically Abused:    • Sexually Abused:        SURGICAL HISTORY  Past Surgical History:   Procedure Laterality Date   • CYST EXCISION     • FOOT SURGERY     • TONSILLECTOMY         CURRENT MEDICATIONS  Home Medications     Reviewed by Chiki Pleitez R.N. (Registered Nurse) on 03/23/21 at 1425  Med List Status: Partial   Medication Last Dose Status   levothyroxine (SYNTHROID) 100 MCG TABS  Active   lorazepam (ATIVAN) 1 MG TABS  Active   ondansetron (ZOFRAN ODT) 4 MG TABLET DISPERSIBLE  Active   ondansetron (ZOFRAN ODT) 4 MG TBDP  Active   pregabalin (LYRICA) 75 MG CAPS  Active                ALLERGIES  No Known Allergies    PHYSICAL EXAM  VITAL SIGNS: /68   Pulse 77   Temp 36.9 °C (98.4 °F) (Temporal)   Resp 16   Ht 1.575 m (5' 2\")   Wt 62.8 kg (138 lb 7.2 oz)   SpO2 97%   BMI 25.32 kg/m²   "     Constitutional: Ill in appearance.   HENT: Normocephalic, Atraumatic, Bilateral external ears normal, Oropharynx puncture wounds to the distal tongue, Nose normal.   Eyes: PERRLA, EOMI, Conjunctiva normal, No discharge.   Neck: Normal range of motion, No tenderness, Supple, No stridor.   Lymphatic: No lymphadenopathy noted.   Cardiovascular: Normal heart rate, Normal rhythm, No murmurs, No rubs, No gallops.   Thorax & Lungs: Normal breath sounds, No respiratory distress, No wheezing, No chest tenderness.   Abdomen: Hepatomegaly, no rebound, no guarding  Skin: Warm, Dry, No erythema, No rash.   Back: No tenderness, No CVA tenderness. .   Extremities: Intact distal pulses, No edema, No tenderness, No cyanosis, No clubbing.   Neurologic: Alert & oriented x 3 with slight confusion and diffuse tremors, Normal motor function, Normal sensory function, No focal deficits noted.   Psychiatric: Affect normal, Judgment normal, Mood normal.       COURSE & MEDICAL DECISION MAKING  Pertinent Labs & Imaging studies reviewed. (See chart for details)  This a 60-year-old female who presents the emergency department with vomiting and diarrhea.  She did have a seizure in triage and I suspect this is from an alcohol withdrawal seizure.  She therefore received IV Ativan and she has been resting much more comfortably after the Ativan.  She has not had any emesis while she has been in the emergency department.  I did review her labs from earlier today and she does have a transaminitis and elevation of the bilirubin consistent with alcohol induced hepatitis.  The patient will be admitted to the hospital she is currently resting comfortably.  The wounds to the tongue will heal via secondary intention.    FINAL IMPRESSION  1.  Seizure  2.  Suspect secondary alcohol withdrawal  3.  Alcohol induced hepatitis  4.  Puncture wound to the tongue from the seizure  5.  Critical care time 30 minutes    Disposition  The patient will be admitted in  guarded condition         Electronically signed by: Deshaun Rodriguez M.D., 3/23/2021 3:39 PM

## 2021-03-24 PROBLEM — Z71.41 ALCOHOL CESSATION COUNSELING: Status: ACTIVE | Noted: 2021-03-24

## 2021-03-24 LAB
ALBUMIN SERPL BCP-MCNC: 3.3 G/DL (ref 3.2–4.9)
ALBUMIN/GLOB SERPL: 1.2 G/DL
ALP SERPL-CCNC: 89 U/L (ref 30–99)
ALT SERPL-CCNC: 45 U/L (ref 2–50)
AMPHET UR QL SCN: NEGATIVE
ANION GAP SERPL CALC-SCNC: 8 MMOL/L (ref 7–16)
AST SERPL-CCNC: 92 U/L (ref 12–45)
BARBITURATES UR QL SCN: NEGATIVE
BENZODIAZ UR QL SCN: POSITIVE
BILIRUB SERPL-MCNC: 1.4 MG/DL (ref 0.1–1.5)
BUN SERPL-MCNC: 5 MG/DL (ref 8–22)
BZE UR QL SCN: NEGATIVE
CALCIUM SERPL-MCNC: 8.6 MG/DL (ref 8.5–10.5)
CANNABINOIDS UR QL SCN: NEGATIVE
CHLORIDE SERPL-SCNC: 97 MMOL/L (ref 96–112)
CO2 SERPL-SCNC: 30 MMOL/L (ref 20–33)
CREAT SERPL-MCNC: 0.44 MG/DL (ref 0.5–1.4)
ERYTHROCYTE [DISTWIDTH] IN BLOOD BY AUTOMATED COUNT: 52.4 FL (ref 35.9–50)
FOLATE SERPL-MCNC: 6.4 NG/ML
GLOBULIN SER CALC-MCNC: 2.7 G/DL (ref 1.9–3.5)
GLUCOSE SERPL-MCNC: 85 MG/DL (ref 65–99)
HCT VFR BLD AUTO: 39.5 % (ref 37–47)
HGB BLD-MCNC: 13 G/DL (ref 12–16)
MAGNESIUM SERPL-MCNC: 1.9 MG/DL (ref 1.5–2.5)
MCH RBC QN AUTO: 37 PG (ref 27–33)
MCHC RBC AUTO-ENTMCNC: 32.9 G/DL (ref 33.6–35)
MCV RBC AUTO: 112.5 FL (ref 81.4–97.8)
METHADONE UR QL SCN: NEGATIVE
OPIATES UR QL SCN: NEGATIVE
OXYCODONE UR QL SCN: NEGATIVE
PCP UR QL SCN: NEGATIVE
PHOSPHATE SERPL-MCNC: 3.4 MG/DL (ref 2.5–4.5)
PLATELET # BLD AUTO: 77 K/UL (ref 164–446)
PMV BLD AUTO: 10.3 FL (ref 9–12.9)
POTASSIUM SERPL-SCNC: 4.4 MMOL/L (ref 3.6–5.5)
PROPOXYPH UR QL SCN: NEGATIVE
PROT SERPL-MCNC: 6 G/DL (ref 6–8.2)
RBC # BLD AUTO: 3.51 M/UL (ref 4.2–5.4)
SODIUM SERPL-SCNC: 135 MMOL/L (ref 135–145)
VIT B12 SERPL-MCNC: 780 PG/ML (ref 211–911)
WBC # BLD AUTO: 3.2 K/UL (ref 4.8–10.8)

## 2021-03-24 PROCEDURE — 99232 SBSQ HOSP IP/OBS MODERATE 35: CPT | Performed by: STUDENT IN AN ORGANIZED HEALTH CARE EDUCATION/TRAINING PROGRAM

## 2021-03-24 PROCEDURE — A9270 NON-COVERED ITEM OR SERVICE: HCPCS | Performed by: HOSPITALIST

## 2021-03-24 PROCEDURE — 85027 COMPLETE CBC AUTOMATED: CPT

## 2021-03-24 PROCEDURE — 80053 COMPREHEN METABOLIC PANEL: CPT

## 2021-03-24 PROCEDURE — 84100 ASSAY OF PHOSPHORUS: CPT

## 2021-03-24 PROCEDURE — 700105 HCHG RX REV CODE 258: Performed by: EMERGENCY MEDICINE

## 2021-03-24 PROCEDURE — 700102 HCHG RX REV CODE 250 W/ 637 OVERRIDE(OP): Performed by: HOSPITALIST

## 2021-03-24 PROCEDURE — 83735 ASSAY OF MAGNESIUM: CPT

## 2021-03-24 PROCEDURE — 36415 COLL VENOUS BLD VENIPUNCTURE: CPT

## 2021-03-24 PROCEDURE — 770006 HCHG ROOM/CARE - MED/SURG/GYN SEMI*

## 2021-03-24 RX ADMIN — Medication 400 MG: at 06:44

## 2021-03-24 RX ADMIN — THERA TABS 1 TABLET: TAB at 06:44

## 2021-03-24 RX ADMIN — CHLORDIAZEPOXIDE HYDROCHLORIDE 25 MG: 25 CAPSULE ORAL at 00:16

## 2021-03-24 RX ADMIN — CHLORDIAZEPOXIDE HYDROCHLORIDE 25 MG: 25 CAPSULE ORAL at 08:29

## 2021-03-24 RX ADMIN — POTASSIUM CHLORIDE 20 MEQ: 1500 TABLET, EXTENDED RELEASE ORAL at 18:50

## 2021-03-24 RX ADMIN — POTASSIUM CHLORIDE 20 MEQ: 1500 TABLET, EXTENDED RELEASE ORAL at 06:44

## 2021-03-24 RX ADMIN — SODIUM CHLORIDE: 9 INJECTION, SOLUTION INTRAVENOUS at 08:36

## 2021-03-24 RX ADMIN — Medication 400 MG: at 18:50

## 2021-03-24 RX ADMIN — CHLORDIAZEPOXIDE HYDROCHLORIDE 25 MG: 25 CAPSULE ORAL at 15:19

## 2021-03-24 RX ADMIN — SODIUM CHLORIDE: 9 INJECTION, SOLUTION INTRAVENOUS at 20:43

## 2021-03-24 RX ADMIN — SODIUM CHLORIDE: 9 INJECTION, SOLUTION INTRAVENOUS at 14:05

## 2021-03-24 RX ADMIN — Medication 100 MG: at 06:44

## 2021-03-24 RX ADMIN — LORAZEPAM 1 MG: 1 TABLET ORAL at 21:41

## 2021-03-24 RX ADMIN — FOLIC ACID 1 MG: 1 TABLET ORAL at 06:44

## 2021-03-24 ASSESSMENT — LIFESTYLE VARIABLES
TOTAL SCORE: 2
EVER FELT BAD OR GUILTY ABOUT YOUR DRINKING: YES
TREMOR: NO TREMOR
HEADACHE, FULLNESS IN HEAD: NOT PRESENT
ANXIETY: MILDLY ANXIOUS
VISUAL DISTURBANCES: NOT PRESENT
TOTAL SCORE: 1
AVERAGE NUMBER OF DAYS PER WEEK YOU HAVE A DRINK CONTAINING ALCOHOL: 0
ORIENTATION AND CLOUDING OF SENSORIUM: ORIENTED AND CAN DO SERIAL ADDITIONS
AUDITORY DISTURBANCES: NOT PRESENT
TOTAL SCORE: 1
DOES PATIENT WANT TO TALK TO SOMEONE ABOUT QUITTING: NO
HEADACHE, FULLNESS IN HEAD: NOT PRESENT
HAVE PEOPLE ANNOYED YOU BY CRITICIZING YOUR DRINKING: NO
ANXIETY: MILDLY ANXIOUS
ORIENTATION AND CLOUDING OF SENSORIUM: ORIENTED AND CAN DO SERIAL ADDITIONS
TOTAL SCORE: VERY MILD ITCHING, PINS AND NEEDLES SENSATION, BURNING OR NUMBNESS
TREMOR: TREMOR NOT VISIBLE BUT CAN BE FELT, FINGERTIP TO FINGERTIP
HEADACHE, FULLNESS IN HEAD: NOT PRESENT
PAROXYSMAL SWEATS: NO SWEAT VISIBLE
VISUAL DISTURBANCES: NOT PRESENT
CONSUMPTION TOTAL: POSITIVE
NAUSEA AND VOMITING: NO NAUSEA AND NO VOMITING
ANXIETY: MILDLY ANXIOUS
AGITATION: NORMAL ACTIVITY
EVER HAD A DRINK FIRST THING IN THE MORNING TO STEADY YOUR NERVES TO GET RID OF A HANGOVER: NO
ORIENTATION AND CLOUDING OF SENSORIUM: CANNOT DO SERIAL ADDITIONS OR IS UNCERTAIN ABOUT DATE
HAVE YOU EVER FELT YOU SHOULD CUT DOWN ON YOUR DRINKING: NO
TOTAL SCORE: 1
AGITATION: NORMAL ACTIVITY
TOTAL SCORE: 10
AUDITORY DISTURBANCES: NOT PRESENT
HOW MANY TIMES IN THE PAST YEAR HAVE YOU HAD 5 OR MORE DRINKS IN A DAY: 14
AGITATION: MODERATELY FIDGETY AND RESTLESS
AUDITORY DISTURBANCES: NOT PRESENT
SUBSTANCE_ABUSE: 0
TOTAL SCORE: 1
HEADACHE, FULLNESS IN HEAD: NOT PRESENT
PAROXYSMAL SWEATS: NO SWEAT VISIBLE
DOES PATIENT WANT TO STOP DRINKING: YES
PAROXYSMAL SWEATS: NO SWEAT VISIBLE
ANXIETY: MODERATELY ANXIOUS OR GUARDED, SO ANXIETY IS INFERRED
TREMOR: NO TREMOR
TREMOR: NO TREMOR
TOTAL SCORE: VERY MILD ITCHING, PINS AND NEEDLES SENSATION, BURNING OR NUMBNESS
NAUSEA AND VOMITING: NO NAUSEA AND NO VOMITING
ORIENTATION AND CLOUDING OF SENSORIUM: ORIENTED AND CAN DO SERIAL ADDITIONS
AGITATION: NORMAL ACTIVITY
VISUAL DISTURBANCES: NOT PRESENT
DO YOU DRINK ALCOHOL: NO
PAROXYSMAL SWEATS: NO SWEAT VISIBLE
ON A TYPICAL DAY WHEN YOU DRINK ALCOHOL HOW MANY DRINKS DO YOU HAVE: 0
NAUSEA AND VOMITING: NO NAUSEA AND NO VOMITING
AUDITORY DISTURBANCES: NOT PRESENT
VISUAL DISTURBANCES: NOT PRESENT
TOTAL SCORE: 2
NAUSEA AND VOMITING: NO NAUSEA AND NO VOMITING

## 2021-03-24 ASSESSMENT — ENCOUNTER SYMPTOMS
SENSORY CHANGE: 0
SHORTNESS OF BREATH: 0
EYE REDNESS: 0
CHILLS: 0
FEVER: 0
BACK PAIN: 0
HEADACHES: 0
EYE DISCHARGE: 0
NAUSEA: 0
WHEEZING: 0
NERVOUS/ANXIOUS: 0
DIZZINESS: 0
CONSTIPATION: 0
HALLUCINATIONS: 0
INSOMNIA: 0
SORE THROAT: 0
DEPRESSION: 0
FOCAL WEAKNESS: 0
DIARRHEA: 0
TINGLING: 0
PALPITATIONS: 0
VOMITING: 0
HEARTBURN: 0
FALLS: 0
WEIGHT LOSS: 0
ABDOMINAL PAIN: 0
COUGH: 0

## 2021-03-24 ASSESSMENT — PAIN DESCRIPTION - PAIN TYPE: TYPE: ACUTE PAIN

## 2021-03-24 NOTE — PROGRESS NOTES
Assumed pt care at shift change.  Pt alert/oriented x4, room air, denies pain.  Discussed POC, updated pt, answered questions.  No complaints of N/V.  Safety precautions in place, call light and personal belongings within reach.  No further needs at this time.

## 2021-03-24 NOTE — PROGRESS NOTES
A&Ox4. Denied pain. CIWA protocol in place. Currently q4 hour CIWA scoring. Denied nausea/vomiting. Fall precautions in place. Pt has home med oral zofran at bedside that she would prefer if  Kavon takes it home tomorrow, provided education that pt is not to take home meds while in hospital, pt verbalized understanding. Pt able to make needs known. Call light and belongings in reach.

## 2021-03-24 NOTE — HOSPITAL COURSE
"60 y.o. female with a history of alcohol abuse in the past, questionable hypothyroidism (not on any medications), neuropathy who presented 3/23/2021 with 3 days of nausea and vomiting after stopping alcohol use 3 days ago.  While in the waiting room in the emergency room she had a grand mal seizure and was given benzodiazepine.  Patient states she has only been drinking to glasses of wine nightly.  She is currently alert and oriented states having seizures in the past secondary to alcohol withdrawals states she was drinking vodka in the higher quantities at that time.  She states she decided to quit drinking alcohol 3 days ago just because \"it was time to quit.\"  She denies any significant abdominal pain she has had liquid stools with urgency but has been able to control it.  She denies any blood or dark black tarry stools.  States that she has been unable to hold down any food but has been able to drink some fluids.  She denies any drug use.    She was treated with alcohol protocol, electrolytes corrected and closely monitored for her withdraw symptom.      "

## 2021-03-24 NOTE — CARE PLAN
Problem: Nutritional:  Goal: Achieve adequate nutritional intake  Description: Patient will consume >50% of meals/supplements  Outcome: NOT MET   See RD note; RD following.

## 2021-03-24 NOTE — PROGRESS NOTES
2 RN skin check completed with RN Juana  Devices in place: PIV  Skin assessed under devices Yes.  Confirmed pressure ulcers found on none.  New potential pressure ulcers noted on none. Wound consult placed n/a.  The following interventions in place pillows in place for positioning, encouraged pt to turn frequently and maintain adequate fluid intake    Behind ears intact  Dry circular flaky skin on right lower abdomen  Sacrum pink/intact  Dry/blanching heels

## 2021-03-24 NOTE — H&P
"Hospital Medicine History & Physical Note    Date of Service  3/23/2021    Primary Care Physician  Mario Trent M.D.    Consultants  None    Code Status  Full Code    Chief Complaint  Chief Complaint   Patient presents with   • Sent from Urgent Care     seen there this morning. had lab work done, states she is dehydrated and considered she has hepatitis.    • Nausea/Vomiting/Diarrhea     x3-4 days.   • Abdominal Pain     upper abd pain x3-4 days.    • Back Pain     low back pain x3-4 days.    • Abnormal Labs     elevated liver enzymes.   • Alcohol Problem     last drink 3 days ago.        History of Presenting Illness  60 y.o. female with a history of alcohol abuse in the past, questionable hypothyroidism (not on any medications), neuropathy who presented 3/23/2021 with 3 days of nausea and vomiting after stopping alcohol use 3 days ago.  While in the waiting room in the emergency room she had a grand mal seizure and was given benzodiazepine.  Patient states she has only been drinking to glasses of wine nightly.  She is currently alert and oriented states having seizures in the past secondary to alcohol withdrawals states she was drinking vodka in the higher quantities at that time.  She states she decided to quit drinking alcohol 3 days ago just because \"it was time to quit.\"  She denies any significant abdominal pain she has had liquid stools with urgency but has been able to control it.  She denies any blood or dark black tarry stools.  States that she has been unable to hold down any food but has been able to drink some fluids.  She denies any drug use.    Review of Systems  Review of Systems   Constitutional: Negative for chills and fever.   Eyes: Negative for discharge.   Respiratory: Negative for cough, shortness of breath and stridor.    Cardiovascular: Negative for chest pain, palpitations and leg swelling.   Gastrointestinal: Positive for abdominal pain, diarrhea, nausea and vomiting. Negative for " blood in stool and melena.   Genitourinary: Negative for dysuria and hematuria.   Musculoskeletal: Negative for joint pain.   Skin: Negative for rash.   Neurological: Negative for dizziness, sensory change, speech change and headaches.   Psychiatric/Behavioral: Positive for substance abuse. Negative for depression. The patient is not nervous/anxious.        Past Medical History   has a past medical history of Alcohol abuse.    Surgical History   has a past surgical history that includes foot surgery; cyst excision; and tonsillectomy.     Family History  Mother  age 70 from metastatic breast cancer.  Father is alive at age 88    Social History   reports that she has never smoked. She has never used smokeless tobacco. She reports current alcohol use of about 1.2 - 1.8 oz of alcohol per week. She reports current drug use.  She is .  Has no biologic children.  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    Allergies  No Known Allergies    Medications  Prior to Admission Medications   Prescriptions Last Dose Informant Patient Reported? Taking?   ondansetron (ZOFRAN ODT) 4 MG TABLET DISPERSIBLE 3/23/2021 at AM Patient No No   Sig: Take 1 tablet by mouth every 6 hours as needed for Nausea for up to 12 doses.      Facility-Administered Medications: None       Physical Exam  Temp:  [36.9 °C (98.4 °F)] 36.9 °C (98.4 °F)  Pulse:  [77-89] 86  Resp:  [16-24] 24  BP: (115-173)/() 147/82  SpO2:  [94 %-97 %] 97 %    Physical Exam  Vitals reviewed.   Constitutional:       Appearance: Normal appearance. She is not diaphoretic.   HENT:      Head: Normocephalic and atraumatic.      Nose: Nose normal.      Mouth/Throat:      Mouth: Mucous membranes are moist.      Pharynx: No oropharyngeal exudate.   Eyes:      General: No scleral icterus.        Right eye: No discharge.         Left eye: No discharge.      Extraocular Movements: Extraocular movements intact.       Conjunctiva/sclera: Conjunctivae normal.   Cardiovascular:      Rate and Rhythm: Normal rate and regular rhythm.      Pulses:           Radial pulses are 2+ on the right side and 2+ on the left side.        Dorsalis pedis pulses are 2+ on the right side and 2+ on the left side.      Heart sounds: No murmur.   Pulmonary:      Effort: Pulmonary effort is normal. No respiratory distress.      Breath sounds: Normal breath sounds. No wheezing or rales.   Abdominal:      General: Bowel sounds are normal. There is no distension.      Palpations: Abdomen is soft.      Tenderness: There is no abdominal tenderness.   Musculoskeletal:         General: No swelling or tenderness.      Cervical back: No rigidity. No muscular tenderness.      Right lower leg: No edema.      Left lower leg: No edema.   Lymphadenopathy:      Cervical: No cervical adenopathy.   Skin:     Coloration: Skin is not jaundiced or pale.   Neurological:      General: No focal deficit present.      Mental Status: She is alert and oriented to person, place, and time. Mental status is at baseline.      Cranial Nerves: No cranial nerve deficit.   Psychiatric:         Mood and Affect: Mood normal.         Behavior: Behavior normal.         Laboratory:  Recent Labs     03/23/21  1034 03/23/21  1534   WBC 3.3* 4.3*   RBC 4.14* 3.97*   HEMOGLOBIN 15.4 15.0   HEMATOCRIT 45.9 44.7   .9* 112.6*   MCH 37.2* 37.8*   MCHC 33.6 33.6   RDW 52.2* 52.5*   PLATELETCT 89* 90*   MPV 9.8 9.7     Recent Labs     03/23/21  1034 03/23/21  1534   SODIUM 132* 135   POTASSIUM 3.7 3.8   CHLORIDE 91* 92*   CO2 28 22   GLUCOSE 91 115*   BUN 6* 6*   CREATININE 0.51 0.49*   CALCIUM 9.2 9.7     Recent Labs     03/23/21  1034 03/23/21  1534   ALTSGPT 65* 66*   ASTSGOT 155* 153*   ALKPHOSPHAT 113* 119*   TBILIRUBIN 2.3* 2.3*   AMYLASE 22  --    LIPASE 19 19   GLUCOSE 91 115*     Recent Labs     03/23/21  1534   APTT 25.6   INR 1.03     No results for input(s): NTPROBNP in the last 72  hours.      No results for input(s): TROPONINT in the last 72 hours.    Imaging:  No orders to display         Assessment/Plan:  I anticipate this patient will require at least two midnights for appropriate medical management, necessitating inpatient admission.    * Seizure due to alcohol withdrawal, uncomplicated (HCC)  Assessment & Plan  Seizure precautions  Patient started on alcohol withdrawal protocol  Checking thyroid studies  Correct electrolytes  Check magnesium    Alcohol abuse  Assessment & Plan  I have encouraged the patient to make daily goals for her alcohol cessation.  Tips discussed.  We will start her on Librium 25 mg every 8 hours and continue with CIWA protocol  Seizure precautions    Nausea vomiting and diarrhea  Assessment & Plan  Concern of dehydration  Fluids with lactated Ringer's x2 L  Monitor labs and blood pressure/vitals,I's and O's  Of note patient did finish a course of antibiotics for tooth infection if ongoing diarrhea will have consideration to check for C. difficile    Alcoholic hepatitis- (present on admission)  Assessment & Plan  Elevated liver enzymes secondary to alcohol abuse    Thrombocytopenia (HCC)  Assessment & Plan  Likely secondary to ongoing alcohol use pulmonary toxicity from alcohol  Monitor CBC    Abnormal LFTs (liver function tests)- (present on admission)  Assessment & Plan  Monitor CMP  Patient denies any significant acetaminophen use  Alcohol use      Hypothyroidism- (present on admission)  Assessment & Plan  Per chart history of hypothyroid but patient denies being on any medications.  I will go ahead and check a TSH with reflex to free thyroxine    Macrocytosis without anemia- (present on admission)  Assessment & Plan  Check folate and B12 if not done in the last few months  Thiamine and folate replacement  Multi-vitamin with minerals

## 2021-03-24 NOTE — PROGRESS NOTES
"Hospital Medicine Daily Progress Note    Date of Service  3/24/2021    Chief Complaint  60 y.o. female admitted 3/23/2021 with Alcohol withdraw    Hospital Course  60 y.o. female with a history of alcohol abuse in the past, questionable hypothyroidism (not on any medications), neuropathy who presented 3/23/2021 with 3 days of nausea and vomiting after stopping alcohol use 3 days ago.  While in the waiting room in the emergency room she had a grand mal seizure and was given benzodiazepine.  Patient states she has only been drinking to glasses of wine nightly.  She is currently alert and oriented states having seizures in the past secondary to alcohol withdrawals states she was drinking vodka in the higher quantities at that time.  She states she decided to quit drinking alcohol 3 days ago just because \"it was time to quit.\"  She denies any significant abdominal pain she has had liquid stools with urgency but has been able to control it.  She denies any blood or dark black tarry stools.  States that she has been unable to hold down any food but has been able to drink some fluids.  She denies any drug use.    She was treated with alcohol protocol, electrolytes corrected and closely monitored for her withdraw symptom.          Interval Problem Update  Tolerates diet  Educated patient about quitting drinking, very motivated   CIWA score 2-3    Consultants/Specialty  none    Code Status  Full Code    Disposition  home    Review of Systems  Review of Systems   Constitutional: Negative for chills, fever, malaise/fatigue and weight loss.   HENT: Negative for congestion and sore throat.    Eyes: Negative for discharge and redness.   Respiratory: Negative for cough, shortness of breath and wheezing.    Cardiovascular: Negative for chest pain, palpitations and leg swelling.   Gastrointestinal: Negative for abdominal pain, constipation, diarrhea, heartburn, nausea and vomiting.   Genitourinary: Negative for dysuria, frequency " and urgency.   Musculoskeletal: Negative for back pain, falls and joint pain.   Skin: Negative for itching and rash.   Neurological: Negative for dizziness, tingling, sensory change, focal weakness and headaches.   Psychiatric/Behavioral: Negative for depression, hallucinations and substance abuse. The patient is not nervous/anxious and does not have insomnia.    All other systems reviewed and are negative.       Physical Exam  Temp:  [36.4 °C (97.6 °F)-36.9 °C (98.4 °F)] 36.4 °C (97.6 °F)  Pulse:  [73-95] 84  Resp:  [16-24] 18  BP: (115-173)/() 123/93  SpO2:  [94 %-98 %] 96 %    Physical Exam  Vitals reviewed.   Constitutional:       General: She is not in acute distress.     Appearance: Normal appearance. She is normal weight. She is not diaphoretic.   HENT:      Head: Normocephalic and atraumatic.      Right Ear: External ear normal.      Left Ear: External ear normal.      Nose: Nose normal.      Mouth/Throat:      Pharynx: Oropharynx is clear.   Eyes:      General:         Right eye: No discharge.         Left eye: No discharge.      Extraocular Movements: Extraocular movements intact.      Conjunctiva/sclera: Conjunctivae normal.      Pupils: Pupils are equal, round, and reactive to light.   Cardiovascular:      Rate and Rhythm: Normal rate and regular rhythm.      Pulses: Normal pulses.      Heart sounds: Normal heart sounds.   Pulmonary:      Effort: Pulmonary effort is normal. No respiratory distress.      Breath sounds: Normal breath sounds. No wheezing.   Abdominal:      General: Abdomen is flat. Bowel sounds are normal. There is no distension.      Palpations: Abdomen is soft.      Tenderness: There is no abdominal tenderness. There is no right CVA tenderness, left CVA tenderness, guarding or rebound.   Musculoskeletal:         General: No swelling, tenderness or deformity. Normal range of motion.      Cervical back: Normal range of motion. No rigidity. No muscular tenderness.      Right lower leg:  No edema.      Left lower leg: No edema.   Skin:     General: Skin is warm and dry.   Neurological:      General: No focal deficit present.      Mental Status: She is alert and oriented to person, place, and time. Mental status is at baseline.      Cranial Nerves: No cranial nerve deficit.      Sensory: No sensory deficit.      Gait: Gait normal.      Deep Tendon Reflexes: Reflexes normal.   Psychiatric:         Mood and Affect: Mood normal.         Behavior: Behavior normal.         Judgment: Judgment normal.         Fluids  No intake or output data in the 24 hours ending 03/24/21 1228    Laboratory  Recent Labs     03/23/21  1034 03/23/21  1534 03/24/21  0232   WBC 3.3* 4.3* 3.2*   RBC 4.14* 3.97* 3.51*   HEMOGLOBIN 15.4 15.0 13.0   HEMATOCRIT 45.9 44.7 39.5   .9* 112.6* 112.5*   MCH 37.2* 37.8* 37.0*   MCHC 33.6 33.6 32.9*   RDW 52.2* 52.5* 52.4*   PLATELETCT 89* 90* 77*   MPV 9.8 9.7 10.3     Recent Labs     03/23/21  1034 03/23/21  1534 03/24/21  0232   SODIUM 132* 135 135   POTASSIUM 3.7 3.8 4.4   CHLORIDE 91* 92* 97   CO2 28 22 30   GLUCOSE 91 115* 85   BUN 6* 6* 5*   CREATININE 0.51 0.49* 0.44*   CALCIUM 9.2 9.7 8.6     Recent Labs     03/23/21  1534   APTT 25.6   INR 1.03               Imaging  No orders to display        Assessment/Plan  * Seizure due to alcohol withdrawal, uncomplicated (HCC)  Assessment & Plan  Seizure precautions  Patient started on alcohol withdrawal protocol  Correct electrolytes    Alcohol abuse  Assessment & Plan  I have encouraged the patient to make daily goals for her alcohol cessation.  Tips discussed.  We will start her on Librium 25 mg every 8 hours and continue with CIWA protocol  Seizure precautions    Nausea vomiting and diarrhea  Assessment & Plan  Concern of dehydration  Fluids with lactated Ringer's x2 L  Monitor labs and blood pressure/vitals,I's and O's  Tolerates diet  No complains about diarrhea    Alcoholic hepatitis- (present on admission)  Assessment &  Plan  Elevated liver enzymes secondary to alcohol abuse    Alcohol cessation counseling  Assessment & Plan  Dicussed about side effect of alcohol drinking  Encouraged alcohol cessation   Agree on quitting    Thrombocytopenia (HCC)  Assessment & Plan  Likely secondary to ongoing alcohol use pulmonary toxicity from alcohol  Monitor CBC    Abnormal LFTs (liver function tests)- (present on admission)  Assessment & Plan  Monitor CMP  Patient denies any significant acetaminophen use  Alcohol use      Hypothyroidism- (present on admission)  Assessment & Plan  Per chart history of hypothyroid but patient denies being on any medications.   TSH elevated  T4 normal  followup with PCP.    Macrocytosis without anemia- (present on admission)  Assessment & Plan  Check folate and B12 if not done in the last few months  Thiamine and folate replacement  Multi-vitamin with minerals       VTE prophylaxis: lovenox

## 2021-03-24 NOTE — CARE PLAN
Problem: Safety  Goal: Will remain free from injury  Outcome: PROGRESSING AS EXPECTED   Fall precautions in place. Treaded socks on.  Per charge, pt to move to room closer by nursing station.     Problem: Safety  Goal: Will remain free from injury  Outcome: PROGRESSING AS EXPECTED   3 Rails up. Reinforced fall education.

## 2021-03-24 NOTE — ASSESSMENT & PLAN NOTE
Per chart history of hypothyroid but patient denies being on any medications.   TSH elevated  T4 normal  followup with PCP.

## 2021-03-24 NOTE — DIETARY
"Nutrition services: Day 1 of admit.  Marilynn Mary is a 60 y.o. female with admitting DX of seizure due to alcohol withdrawal.  Consult received for MST 2 (poor PO, 2-13 lb wt loss over <1 week).    Spoke with patient at bedside. Patient appears with no overt signs of fat or muscle wasting. Patient reports UBW ranges 145-147 lb, last reports being in this weight range last Saturday. Patient reports breakfast today was her first meal since Saturday - patient has not been able to eat x 4 days due to nausea, emesis, and diarrhea. Prior to this acute event, patient states her intake was adequate; however, notes she has been consuming soft foods like yogurt, jello, pudding, and soups while getting accustomed to her upper dentures. Pt states she is now \"burnt out\" on these foods, denied need for easy-to-chew diet.     Patient reports nausea is improved, but appetite is not yet to baseline. Was only able to consume ~50% of breakfast this morning. Patient relays she drinks Ensure at home, was requesting Boost (van flavor) with her lunch meal- added to diet per RD poor PO protocol.    Assessment:  Height: 157.5 cm (5' 2\")  Weight: 62.8 kg (138 lb 7.2 oz) via stand up scale 3/23.  Body mass index is 25.32 kg/m²., BMI classification: normal  Diet/Intake: Regular diet. No PO documented yet.     Evaluation:   1. PMH: ETOH abuse, questionable hypothyroidism, hepatitis  2. No weight history over past year in chart review.   3. MAR: LR @ 125 mL/h (complete), mag-ox, NS @ 200 mL/h, zofran, thiamine, MVI, folvite  4. Labs: BUN 5 (L), Creat 0.44 (L)  5. Last bowel movement 3/22 - suspect diarrhea resolved.      Malnutrition Risk: Patient does not meet criteria at this time.     Recommendations/Plan:  1. Add Boost supplement (van) with lunch meal per RD poor PO protocol (PO<50% x >48 h).  2. Encourage intake of meals and supplements.  3. Document intake of all meals and supplements as % taken in ADL's to provide interdisciplinary " communication across all shifts.   4. Monitor weight.  5. Nutrition rep will continue to see patient for ongoing meal and snack preferences.     RD following.

## 2021-03-24 NOTE — CARE PLAN
Problem: Communication  Goal: The ability to communicate needs accurately and effectively will improve  Outcome: PROGRESSING AS EXPECTED  Intervention: Educate patient and significant other/support system about the plan of care, procedures, treatments, medications and allow for questions  Flowsheets (Taken 3/24/2021 1613)  Pt & Family Have Been Educated on Methods Available to Report Concerns Related to Care, Treatment, Services, and Patient Safety Issues: Yes  Note: Update pt regarding POC, allow for questions.  Provide education regarding the need for continued monitoring, alcohol withdrawal, and fall prevention.     Problem: Knowledge Deficit  Goal: Knowledge of disease process/condition, treatment plan, diagnostic tests, and medications will improve  Outcome: PROGRESSING AS EXPECTED

## 2021-03-25 LAB
ALBUMIN SERPL BCP-MCNC: 3.4 G/DL (ref 3.2–4.9)
ALBUMIN/GLOB SERPL: 1.3 G/DL
ALP SERPL-CCNC: 88 U/L (ref 30–99)
ALT SERPL-CCNC: 37 U/L (ref 2–50)
ANION GAP SERPL CALC-SCNC: 14 MMOL/L (ref 7–16)
AST SERPL-CCNC: 75 U/L (ref 12–45)
BASOPHILS # BLD AUTO: 0.6 % (ref 0–1.8)
BASOPHILS # BLD: 0.02 K/UL (ref 0–0.12)
BILIRUB SERPL-MCNC: 1.4 MG/DL (ref 0.1–1.5)
BUN SERPL-MCNC: 3 MG/DL (ref 8–22)
CALCIUM SERPL-MCNC: 8.2 MG/DL (ref 8.5–10.5)
CHLORIDE SERPL-SCNC: 106 MMOL/L (ref 96–112)
CO2 SERPL-SCNC: 21 MMOL/L (ref 20–33)
CREAT SERPL-MCNC: 0.3 MG/DL (ref 0.5–1.4)
EOSINOPHIL # BLD AUTO: 0.04 K/UL (ref 0–0.51)
EOSINOPHIL NFR BLD: 1.1 % (ref 0–6.9)
ERYTHROCYTE [DISTWIDTH] IN BLOOD BY AUTOMATED COUNT: 52.8 FL (ref 35.9–50)
GLOBULIN SER CALC-MCNC: 2.6 G/DL (ref 1.9–3.5)
GLUCOSE SERPL-MCNC: 72 MG/DL (ref 65–99)
HCT VFR BLD AUTO: 39.8 % (ref 37–47)
HGB BLD-MCNC: 13.2 G/DL (ref 12–16)
IMM GRANULOCYTES # BLD AUTO: 0.01 K/UL (ref 0–0.11)
IMM GRANULOCYTES NFR BLD AUTO: 0.3 % (ref 0–0.9)
LYMPHOCYTES # BLD AUTO: 0.79 K/UL (ref 1–4.8)
LYMPHOCYTES NFR BLD: 21.9 % (ref 22–41)
MAGNESIUM SERPL-MCNC: 1.6 MG/DL (ref 1.5–2.5)
MCH RBC QN AUTO: 37.7 PG (ref 27–33)
MCHC RBC AUTO-ENTMCNC: 33.2 G/DL (ref 33.6–35)
MCV RBC AUTO: 113.7 FL (ref 81.4–97.8)
MONOCYTES # BLD AUTO: 0.46 K/UL (ref 0–0.85)
MONOCYTES NFR BLD AUTO: 12.7 % (ref 0–13.4)
NEUTROPHILS # BLD AUTO: 2.29 K/UL (ref 2–7.15)
NEUTROPHILS NFR BLD: 63.4 % (ref 44–72)
NRBC # BLD AUTO: 0 K/UL
NRBC BLD-RTO: 0 /100 WBC
PLATELET # BLD AUTO: 87 K/UL (ref 164–446)
PMV BLD AUTO: 10.2 FL (ref 9–12.9)
POTASSIUM SERPL-SCNC: 4.2 MMOL/L (ref 3.6–5.5)
PROT SERPL-MCNC: 6 G/DL (ref 6–8.2)
RBC # BLD AUTO: 3.5 M/UL (ref 4.2–5.4)
SODIUM SERPL-SCNC: 141 MMOL/L (ref 135–145)
WBC # BLD AUTO: 3.6 K/UL (ref 4.8–10.8)

## 2021-03-25 PROCEDURE — 700102 HCHG RX REV CODE 250 W/ 637 OVERRIDE(OP): Performed by: HOSPITALIST

## 2021-03-25 PROCEDURE — 85025 COMPLETE CBC W/AUTO DIFF WBC: CPT

## 2021-03-25 PROCEDURE — A9270 NON-COVERED ITEM OR SERVICE: HCPCS | Performed by: HOSPITALIST

## 2021-03-25 PROCEDURE — 36415 COLL VENOUS BLD VENIPUNCTURE: CPT

## 2021-03-25 PROCEDURE — 83735 ASSAY OF MAGNESIUM: CPT

## 2021-03-25 PROCEDURE — 700105 HCHG RX REV CODE 258: Performed by: EMERGENCY MEDICINE

## 2021-03-25 PROCEDURE — 700111 HCHG RX REV CODE 636 W/ 250 OVERRIDE (IP): Performed by: HOSPITALIST

## 2021-03-25 PROCEDURE — 99233 SBSQ HOSP IP/OBS HIGH 50: CPT | Performed by: HOSPITALIST

## 2021-03-25 PROCEDURE — 770020 HCHG ROOM/CARE - TELE (206)

## 2021-03-25 PROCEDURE — 80053 COMPREHEN METABOLIC PANEL: CPT

## 2021-03-25 PROCEDURE — 700111 HCHG RX REV CODE 636 W/ 250 OVERRIDE (IP): Performed by: STUDENT IN AN ORGANIZED HEALTH CARE EDUCATION/TRAINING PROGRAM

## 2021-03-25 PROCEDURE — 700105 HCHG RX REV CODE 258: Performed by: HOSPITALIST

## 2021-03-25 RX ORDER — LORAZEPAM 2 MG/ML
2 INJECTION INTRAMUSCULAR ONCE
Status: COMPLETED | OUTPATIENT
Start: 2021-03-25 | End: 2021-03-25

## 2021-03-25 RX ORDER — HALOPERIDOL 5 MG/ML
3 INJECTION INTRAMUSCULAR ONCE
Status: COMPLETED | OUTPATIENT
Start: 2021-03-25 | End: 2021-03-25

## 2021-03-25 RX ORDER — MAGNESIUM SULFATE HEPTAHYDRATE 40 MG/ML
2 INJECTION, SOLUTION INTRAVENOUS ONCE
Status: COMPLETED | OUTPATIENT
Start: 2021-03-25 | End: 2021-03-25

## 2021-03-25 RX ORDER — HALOPERIDOL 5 MG/ML
2 INJECTION INTRAMUSCULAR ONCE
Status: COMPLETED | OUTPATIENT
Start: 2021-03-25 | End: 2021-03-25

## 2021-03-25 RX ADMIN — Medication 400 MG: at 05:47

## 2021-03-25 RX ADMIN — LORAZEPAM 1.5 MG: 2 INJECTION INTRAMUSCULAR; INTRAVENOUS at 17:38

## 2021-03-25 RX ADMIN — Medication 100 MG: at 05:47

## 2021-03-25 RX ADMIN — LORAZEPAM 0.5 MG: 2 INJECTION INTRAMUSCULAR; INTRAVENOUS at 18:38

## 2021-03-25 RX ADMIN — POTASSIUM CHLORIDE 20 MEQ: 1500 TABLET, EXTENDED RELEASE ORAL at 18:16

## 2021-03-25 RX ADMIN — LORAZEPAM 1 MG: 2 INJECTION INTRAMUSCULAR; INTRAVENOUS at 08:52

## 2021-03-25 RX ADMIN — LORAZEPAM 1 MG: 2 INJECTION INTRAMUSCULAR; INTRAVENOUS at 12:28

## 2021-03-25 RX ADMIN — DOCUSATE SODIUM 50 MG AND SENNOSIDES 8.6 MG 2 TABLET: 8.6; 5 TABLET, FILM COATED ORAL at 18:16

## 2021-03-25 RX ADMIN — LORAZEPAM 2 MG: 2 INJECTION INTRAMUSCULAR; INTRAVENOUS at 04:57

## 2021-03-25 RX ADMIN — POTASSIUM CHLORIDE 20 MEQ: 1500 TABLET, EXTENDED RELEASE ORAL at 05:47

## 2021-03-25 RX ADMIN — LORAZEPAM 3 MG: 1 TABLET ORAL at 01:21

## 2021-03-25 RX ADMIN — SODIUM CHLORIDE: 9 INJECTION, SOLUTION INTRAVENOUS at 01:49

## 2021-03-25 RX ADMIN — HALOPERIDOL LACTATE 2 MG: 5 INJECTION, SOLUTION INTRAMUSCULAR at 04:06

## 2021-03-25 RX ADMIN — HALOPERIDOL LACTATE 3 MG: 5 INJECTION, SOLUTION INTRAMUSCULAR at 04:40

## 2021-03-25 RX ADMIN — FOLIC ACID 1 MG: 1 TABLET ORAL at 05:47

## 2021-03-25 RX ADMIN — LORAZEPAM 0.5 MG: 2 INJECTION INTRAMUSCULAR; INTRAVENOUS at 15:58

## 2021-03-25 RX ADMIN — LORAZEPAM 1.5 MG: 2 INJECTION INTRAMUSCULAR; INTRAVENOUS at 06:25

## 2021-03-25 RX ADMIN — MAGNESIUM SULFATE 2 G: 2 INJECTION INTRAVENOUS at 13:04

## 2021-03-25 RX ADMIN — LORAZEPAM 1 MG: 1 TABLET ORAL at 02:35

## 2021-03-25 RX ADMIN — DOCUSATE SODIUM 50 MG AND SENNOSIDES 8.6 MG 2 TABLET: 8.6; 5 TABLET, FILM COATED ORAL at 05:47

## 2021-03-25 RX ADMIN — ENOXAPARIN SODIUM 40 MG: 40 INJECTION SUBCUTANEOUS at 05:47

## 2021-03-25 RX ADMIN — LORAZEPAM 1.5 MG: 2 INJECTION INTRAMUSCULAR; INTRAVENOUS at 03:40

## 2021-03-25 RX ADMIN — THERA TABS 1 TABLET: TAB at 05:47

## 2021-03-25 RX ADMIN — SODIUM CHLORIDE: 9 INJECTION, SOLUTION INTRAVENOUS at 12:36

## 2021-03-25 RX ADMIN — LORAZEPAM 2 MG: 2 INJECTION INTRAMUSCULAR; INTRAVENOUS at 05:47

## 2021-03-25 ASSESSMENT — LIFESTYLE VARIABLES
AUDITORY DISTURBANCES: NOT PRESENT
AUDITORY DISTURBANCES: NOT PRESENT
TREMOR: *
PAROXYSMAL SWEATS: BARELY PERCEPTIBLE SWEATING. PALMS MOIST
TOTAL SCORE: 7
AGITATION: SOMEWHAT MORE THAN NORMAL ACTIVITY
ANXIETY: *
TOTAL SCORE: 31
AGITATION: MODERATELY FIDGETY AND RESTLESS
ORIENTATION AND CLOUDING OF SENSORIUM: DISORIENTED FOR PLACE AND / OR PERSON
VISUAL DISTURBANCES: NOT PRESENT
HEADACHE, FULLNESS IN HEAD: NOT PRESENT
NAUSEA AND VOMITING: MILD NAUSEA WITH NO VOMITING
ANXIETY: MILDLY ANXIOUS
ANXIETY: MODERATELY ANXIOUS OR GUARDED, SO ANXIETY IS INFERRED
HEADACHE, FULLNESS IN HEAD: NOT PRESENT
HEADACHE, FULLNESS IN HEAD: NOT PRESENT
AUDITORY DISTURBANCES: NOT PRESENT
AUDITORY DISTURBANCES: NOT PRESENT
AGITATION: SOMEWHAT MORE THAN NORMAL ACTIVITY
AGITATION: NORMAL ACTIVITY
AUDITORY DISTURBANCES: NOT PRESENT
TOTAL SCORE: 12
HEADACHE, FULLNESS IN HEAD: NOT PRESENT
ANXIETY: *
TREMOR: *
ORIENTATION AND CLOUDING OF SENSORIUM: DATE DISORIENTATION BY MORE THAN TWO CALENDAR DAYS
ORIENTATION AND CLOUDING OF SENSORIUM: DATE DISORIENTATION BY MORE THAN TWO CALENDAR DAYS
PAROXYSMAL SWEATS: BARELY PERCEPTIBLE SWEATING. PALMS MOIST
HEADACHE, FULLNESS IN HEAD: NOT PRESENT
ANXIETY: NO ANXIETY (AT EASE)
NAUSEA AND VOMITING: NO NAUSEA AND NO VOMITING
VISUAL DISTURBANCES: NOT PRESENT
TREMOR: MODERATE TREMOR WITH ARMS EXTENDED
NAUSEA AND VOMITING: NO NAUSEA AND NO VOMITING
VISUAL DISTURBANCES: NOT PRESENT
AUDITORY DISTURBANCES: NOT PRESENT
TREMOR: *
ANXIETY: MODERATELY ANXIOUS OR GUARDED, SO ANXIETY IS INFERRED
ORIENTATION AND CLOUDING OF SENSORIUM: DISORIENTED FOR PLACE AND / OR PERSON
ORIENTATION AND CLOUDING OF SENSORIUM: ORIENTED AND CAN DO SERIAL ADDITIONS
VISUAL DISTURBANCES: MODERATELY SEVERE HALLUCINATIONS
TREMOR: *
AGITATION: *
ANXIETY: *
HEADACHE, FULLNESS IN HEAD: NOT PRESENT
AGITATION: *
PAROXYSMAL SWEATS: NO SWEAT VISIBLE
HEADACHE, FULLNESS IN HEAD: NOT PRESENT
NAUSEA AND VOMITING: NO NAUSEA AND NO VOMITING
HEADACHE, FULLNESS IN HEAD: NOT PRESENT
AGITATION: MODERATELY FIDGETY AND RESTLESS
TREMOR: *
ORIENTATION AND CLOUDING OF SENSORIUM: ORIENTED AND CAN DO SERIAL ADDITIONS
PAROXYSMAL SWEATS: *
PAROXYSMAL SWEATS: BARELY PERCEPTIBLE SWEATING. PALMS MOIST
TOTAL SCORE: 10
VISUAL DISTURBANCES: NOT PRESENT
TOTAL SCORE: 22
HEADACHE, FULLNESS IN HEAD: NOT PRESENT
AUDITORY DISTURBANCES: NOT PRESENT
ANXIETY: NO ANXIETY (AT EASE)
VISUAL DISTURBANCES: NOT PRESENT
TOTAL SCORE: 16
PAROXYSMAL SWEATS: NO SWEAT VISIBLE
ANXIETY: EQUIVALENT TO ACUTE PANIC STATES AS OCCUR IN SEVERE DELIRIUM OR ACUTE SCHIZOPHRENIC REACTIONS
VISUAL DISTURBANCES: MILD SENSITIVITY
NAUSEA AND VOMITING: NO NAUSEA AND NO VOMITING
AUDITORY DISTURBANCES: MODERATE HARSHNESS OR ABILITY TO FRIGHTEN
ORIENTATION AND CLOUDING OF SENSORIUM: ORIENTED AND CAN DO SERIAL ADDITIONS
VISUAL DISTURBANCES: SEVERE HALLUCINATIONS
AUDITORY DISTURBANCES: NOT PRESENT
VISUAL DISTURBANCES: NOT PRESENT
AGITATION: NORMAL ACTIVITY
NAUSEA AND VOMITING: NO NAUSEA AND NO VOMITING
TREMOR: TREMOR NOT VISIBLE BUT CAN BE FELT, FINGERTIP TO FINGERTIP
TOTAL SCORE: 17
AGITATION: *
PAROXYSMAL SWEATS: BARELY PERCEPTIBLE SWEATING. PALMS MOIST
AUDITORY DISTURBANCES: MODERATE HARSHNESS OR ABILITY TO FRIGHTEN
ORIENTATION AND CLOUDING OF SENSORIUM: DATE DISORIENTATION BY MORE THAN TWO CALENDAR DAYS
TOTAL SCORE: 24
NAUSEA AND VOMITING: NO NAUSEA AND NO VOMITING
PAROXYSMAL SWEATS: *
HEADACHE, FULLNESS IN HEAD: NOT PRESENT
TREMOR: *
NAUSEA AND VOMITING: *
TOTAL SCORE: 10
ORIENTATION AND CLOUDING OF SENSORIUM: DISORIENTED FOR PLACE AND / OR PERSON
TOTAL SCORE: 14
TOTAL SCORE: 10
AUDITORY DISTURBANCES: NOT PRESENT
NAUSEA AND VOMITING: *
AGITATION: *
NAUSEA AND VOMITING: NO NAUSEA AND NO VOMITING
TREMOR: *
ORIENTATION AND CLOUDING OF SENSORIUM: DATE DISORIENTATION BY MORE THAN TWO CALENDAR DAYS
ORIENTATION AND CLOUDING OF SENSORIUM: DATE DISORIENTATION BY MORE THAN TWO CALENDAR DAYS
TREMOR: MODERATE TREMOR WITH ARMS EXTENDED
VISUAL DISTURBANCES: MODERATE SENSITIVITY
TREMOR: TREMOR NOT VISIBLE BUT CAN BE FELT, FINGERTIP TO FINGERTIP
AGITATION: NORMAL ACTIVITY
ANXIETY: *
PAROXYSMAL SWEATS: BARELY PERCEPTIBLE SWEATING. PALMS MOIST
ANXIETY: *
PAROXYSMAL SWEATS: *
VISUAL DISTURBANCES: NOT PRESENT
NAUSEA AND VOMITING: MILD NAUSEA WITH NO VOMITING
PAROXYSMAL SWEATS: NO SWEAT VISIBLE
HEADACHE, FULLNESS IN HEAD: NOT PRESENT

## 2021-03-25 ASSESSMENT — COGNITIVE AND FUNCTIONAL STATUS - GENERAL
SUGGESTED CMS G CODE MODIFIER MOBILITY: CK
WALKING IN HOSPITAL ROOM: A LITTLE
MOVING TO AND FROM BED TO CHAIR: A LITTLE
TOILETING: A LITTLE
STANDING UP FROM CHAIR USING ARMS: A LITTLE
MOBILITY SCORE: 19
MOVING FROM LYING ON BACK TO SITTING ON SIDE OF FLAT BED: A LITTLE
SUGGESTED CMS G CODE MODIFIER DAILY ACTIVITY: CK
EATING MEALS: A LITTLE
PERSONAL GROOMING: A LITTLE
HELP NEEDED FOR BATHING: A LITTLE
DAILY ACTIVITIY SCORE: 18
CLIMB 3 TO 5 STEPS WITH RAILING: A LITTLE
DRESSING REGULAR LOWER BODY CLOTHING: A LITTLE
DRESSING REGULAR UPPER BODY CLOTHING: A LITTLE

## 2021-03-25 ASSESSMENT — FIBROSIS 4 INDEX: FIB4 SCORE: 8.22

## 2021-03-25 NOTE — PROGRESS NOTES
Pt is a high fall risk. Pt is very impulsive and restless at this time; constantly trying to get out of bed and asking bizarre questions to nursing staff.    2100: CIWA 10; 1 mg PO Ativan given per MAR.    Pt jumped out of bed to head to the bathroom and almost ripped out IV on her left arm. Pt was bleeding quite a bit, but IV is still intact. Charge RN is updated on the current situation and pt now has a 1:1 sitter.     0100: CIWA 17; 3 mg PO Ativan given per MAR. Q1 CIWA in place.    0200: CIWA 10; 1 mg PO Ativan given per MAR. Q4 CIWA in place.    0300: CIWA 24; 1.5 mg IV Ativan given per MAR. Q1 CIWA in place.    0357: Security present at bedside. Pt is now in bilateral soft wrist restraints. Paging hospitalist for updates.     0409: Received order for one time dose of 2 mg IV Haldol. Instructed to page hospitalist Mandie Meneses in 30 minutes if pt is still agitated and restless.     0432: Paged Mandie Meneses for updates. Additional 3 mg IV Haldol ordered. Instructed to page hospitalist in 10 minutes if pt is still restless. Pt is hooked up to continuous pulse ox at this time. Hospitalist working on transferring pt to higher level of care.     0445: Received call from Mandie Meneses. She is putting in transfer order to telemetry. Pt it tolerating the Haldol well, less combative. Instructed to give 2 mg Ativan if pt becomes agitated again. Pending transfer to telemetry at this time.     0457: STAT 2 mg IV Ativan given to pt per Mandie Meneses's request. Pending transfer to [id number].    0522: Called pt's  Kavon for updates; no answer, voicemail left. Pt left [facility] and is en route to T7. Gave report to Erin.

## 2021-03-25 NOTE — PROGRESS NOTES
Report received, patient agitated pulling on restraints aox0, tele box placed. Incontinent,linen changed.

## 2021-03-25 NOTE — PROGRESS NOTES
Received report from NOC RN. Assumed care of patient at 0700. Patient A&Ox 0, lethargic, not speaking in full sentences. Patient is uncooperative and does not follow commands or respond appropriately to questions. On 2.5L NC, no signs of respiratory distress. Respirations are even and unlabored. Patient states no pain at this time. POC discussed and agreed upon with patient. Call light and belongings within reach. Bed in lowest locked position. Upper side rails raised. Bed alarm on. Fall risk precautions in place. Hourly rounding. Will continue to monitor CIWA scores.

## 2021-03-25 NOTE — PROGRESS NOTES
"Hospital Medicine Daily Progress Note    Date of Service  3/25/2021    Chief Complaint  60 y.o. female admitted 3/23/2021 with Alcohol withdraw    Hospital Course  60 y.o. female with a history of alcohol abuse in the past, questionable hypothyroidism (not on any medications), neuropathy who presented 3/23/2021 with 3 days of nausea and vomiting after stopping alcohol use 3 days ago.  While in the waiting room in the emergency room she had a grand mal seizure and was given benzodiazepine.  Patient states she has only been drinking to glasses of wine nightly.  She is currently alert and oriented states having seizures in the past secondary to alcohol withdrawals states she was drinking vodka in the higher quantities at that time.  She states she decided to quit drinking alcohol 3 days ago just because \"it was time to quit.\"  She denies any significant abdominal pain she has had liquid stools with urgency but has been able to control it.  She denies any blood or dark black tarry stools.  States that she has been unable to hold down any food but has been able to drink some fluids.  She denies any drug use.    She was treated with alcohol protocol, electrolytes corrected and closely monitored for her withdraw symptom.          Interval Problem Update  Sedated    restrained    Mag 1.6      afebrile    Consultants/Specialty  none    Code Status  Full Code    Disposition  home    Review of Systems  Review of Systems   Unable to perform ROS: Mental status change        Physical Exam  Temp:  [36 °C (96.8 °F)-36.5 °C (97.7 °F)] 36 °C (96.8 °F)  Pulse:  [] 71  Resp:  [17-20] 19  BP: (108-134)/(72-84) 115/73  SpO2:  [95 %-98 %] 98 %    Physical Exam  Vitals reviewed.   Constitutional:       General: She is not in acute distress.     Appearance: Normal appearance. She is normal weight. She is not diaphoretic.   HENT:      Head: Normocephalic and atraumatic.      Right Ear: External ear normal.      Left Ear: External ear " normal.      Nose: Nose normal.      Mouth/Throat:      Pharynx: Oropharynx is clear.   Eyes:      General:         Right eye: No discharge.         Left eye: No discharge.      Extraocular Movements: Extraocular movements intact.      Conjunctiva/sclera: Conjunctivae normal.      Pupils: Pupils are equal, round, and reactive to light.   Cardiovascular:      Rate and Rhythm: Normal rate and regular rhythm.      Pulses: Normal pulses.      Heart sounds: Normal heart sounds.   Pulmonary:      Effort: Pulmonary effort is normal. No respiratory distress.      Breath sounds: Rhonchi (mild. bilateral) present. No wheezing.   Abdominal:      General: Abdomen is flat. Bowel sounds are normal. There is no distension.      Palpations: Abdomen is soft.      Tenderness: There is no abdominal tenderness. There is no right CVA tenderness, left CVA tenderness, guarding or rebound.   Musculoskeletal:         General: No swelling, tenderness or deformity. Normal range of motion.      Cervical back: Normal range of motion. No rigidity. No muscular tenderness.      Right lower leg: No edema.      Left lower leg: No edema.   Skin:     General: Skin is warm and dry.   Neurological:      General: No focal deficit present.      Mental Status: Mental status is at baseline.      Cranial Nerves: No cranial nerve deficit.      Sensory: No sensory deficit.      Gait: Gait normal.      Deep Tendon Reflexes: Reflexes normal.      Comments: sedated   Psychiatric:         Mood and Affect: Mood normal.         Behavior: Behavior normal.         Judgment: Judgment normal.         Fluids    Intake/Output Summary (Last 24 hours) at 3/25/2021 0845  Last data filed at 3/24/2021 1854  Gross per 24 hour   Intake 1566 ml   Output --   Net 1566 ml       Laboratory  Recent Labs     03/23/21  1534 03/24/21  0232 03/25/21  0206   WBC 4.3* 3.2* 3.6*   RBC 3.97* 3.51* 3.50*   HEMOGLOBIN 15.0 13.0 13.2   HEMATOCRIT 44.7 39.5 39.8   .6* 112.5* 113.7*   MCH  37.8* 37.0* 37.7*   MCHC 33.6 32.9* 33.2*   RDW 52.5* 52.4* 52.8*   PLATELETCT 90* 77* 87*   MPV 9.7 10.3 10.2     Recent Labs     03/23/21  1534 03/24/21  0232 03/25/21  0206   SODIUM 135 135 141   POTASSIUM 3.8 4.4 4.2   CHLORIDE 92* 97 106   CO2 22 30 21   GLUCOSE 115* 85 72   BUN 6* 5* 3*   CREATININE 0.49* 0.44* 0.30*   CALCIUM 9.7 8.6 8.2*     Recent Labs     03/23/21  1534   APTT 25.6   INR 1.03               Imaging  No orders to display        Assessment/Plan  * Seizure due to alcohol withdrawal, uncomplicated (HCC)- (present on admission)  Assessment & Plan  Seizure precautions   on alcohol withdrawal protocol  Correct electrolytes    Alcohol abuse  Assessment & Plan     continue with CIWA protocol  Seizure precautions    Nausea vomiting and diarrhea- (present on admission)  Assessment & Plan  ivf     Alcoholic hepatitis- (present on admission)  Assessment & Plan  Elevated liver enzymes secondary to alcohol abuse    Alcohol cessation counseling- (present on admission)  Assessment & Plan  Dicussed about side effect of alcohol drinking  Encouraged alcohol cessation   Agree on quitting    Thrombocytopenia (HCC)- (present on admission)  Assessment & Plan  Likely secondary to ongoing alcohol use pulmonary toxicity from alcohol  Monitor CBC    Abnormal LFTs (liver function tests)- (present on admission)  Assessment & Plan  Monitor CMP  Patient denies any significant acetaminophen use  Alcohol use      Hypomagnesemia- (present on admission)  Assessment & Plan  Replace iv    Hypothyroidism- (present on admission)  Assessment & Plan  Per chart history of hypothyroid but patient denies being on any medications.   TSH elevated  T4 normal  followup with PCP.    Macrocytosis without anemia- (present on admission)  Assessment & Plan  Check folate and B12 if not done in the last few months  Thiamine and folate replacement  Multi-vitamin with minerals       VTE prophylaxis: lovenox      Check am cbc, cmp

## 2021-03-25 NOTE — PROGRESS NOTES
2 RN skin check complete.   Devices in place oxygen.  Skin assessed under devices intact.  Confirmed pressure ulcers found on n/a.  New potential pressure ulcers noted on n/a. Wound consult placed n/a.  Redness on bilateral elbows but blanching

## 2021-03-26 LAB
ALBUMIN SERPL BCP-MCNC: 3.5 G/DL (ref 3.2–4.9)
ALBUMIN/GLOB SERPL: 1.1 G/DL
ALP SERPL-CCNC: 91 U/L (ref 30–99)
ALT SERPL-CCNC: 33 U/L (ref 2–50)
ANION GAP SERPL CALC-SCNC: 17 MMOL/L (ref 7–16)
AST SERPL-CCNC: 64 U/L (ref 12–45)
BILIRUB SERPL-MCNC: 1.4 MG/DL (ref 0.1–1.5)
BUN SERPL-MCNC: 4 MG/DL (ref 8–22)
CALCIUM SERPL-MCNC: 8.5 MG/DL (ref 8.5–10.5)
CHLORIDE SERPL-SCNC: 98 MMOL/L (ref 96–112)
CO2 SERPL-SCNC: 17 MMOL/L (ref 20–33)
CREAT SERPL-MCNC: 0.37 MG/DL (ref 0.5–1.4)
ERYTHROCYTE [DISTWIDTH] IN BLOOD BY AUTOMATED COUNT: 53.3 FL (ref 35.9–50)
GLOBULIN SER CALC-MCNC: 3.1 G/DL (ref 1.9–3.5)
GLUCOSE SERPL-MCNC: 79 MG/DL (ref 65–99)
HCT VFR BLD AUTO: 41.3 % (ref 37–47)
HGB BLD-MCNC: 13.4 G/DL (ref 12–16)
MCH RBC QN AUTO: 37.6 PG (ref 27–33)
MCHC RBC AUTO-ENTMCNC: 32.4 G/DL (ref 33.6–35)
MCV RBC AUTO: 116 FL (ref 81.4–97.8)
PLATELET # BLD AUTO: 111 K/UL (ref 164–446)
PMV BLD AUTO: 9.3 FL (ref 9–12.9)
POTASSIUM SERPL-SCNC: 4.2 MMOL/L (ref 3.6–5.5)
PROT SERPL-MCNC: 6.6 G/DL (ref 6–8.2)
RBC # BLD AUTO: 3.56 M/UL (ref 4.2–5.4)
SODIUM SERPL-SCNC: 132 MMOL/L (ref 135–145)
WBC # BLD AUTO: 6.5 K/UL (ref 4.8–10.8)

## 2021-03-26 PROCEDURE — 700111 HCHG RX REV CODE 636 W/ 250 OVERRIDE (IP): Performed by: HOSPITALIST

## 2021-03-26 PROCEDURE — 97161 PT EVAL LOW COMPLEX 20 MIN: CPT

## 2021-03-26 PROCEDURE — 80053 COMPREHEN METABOLIC PANEL: CPT

## 2021-03-26 PROCEDURE — A9270 NON-COVERED ITEM OR SERVICE: HCPCS | Performed by: HOSPITALIST

## 2021-03-26 PROCEDURE — 97165 OT EVAL LOW COMPLEX 30 MIN: CPT

## 2021-03-26 PROCEDURE — 99233 SBSQ HOSP IP/OBS HIGH 50: CPT | Performed by: HOSPITALIST

## 2021-03-26 PROCEDURE — 700102 HCHG RX REV CODE 250 W/ 637 OVERRIDE(OP): Performed by: HOSPITALIST

## 2021-03-26 PROCEDURE — 770020 HCHG ROOM/CARE - TELE (206)

## 2021-03-26 PROCEDURE — 85027 COMPLETE CBC AUTOMATED: CPT

## 2021-03-26 PROCEDURE — 36415 COLL VENOUS BLD VENIPUNCTURE: CPT

## 2021-03-26 RX ADMIN — DOCUSATE SODIUM 50 MG AND SENNOSIDES 8.6 MG 2 TABLET: 8.6; 5 TABLET, FILM COATED ORAL at 05:33

## 2021-03-26 RX ADMIN — LORAZEPAM 0.5 MG: 0.5 TABLET ORAL at 08:23

## 2021-03-26 RX ADMIN — LORAZEPAM 1 MG: 1 TABLET ORAL at 14:42

## 2021-03-26 RX ADMIN — FOLIC ACID 1 MG: 1 TABLET ORAL at 05:32

## 2021-03-26 RX ADMIN — DOCUSATE SODIUM 50 MG AND SENNOSIDES 8.6 MG 2 TABLET: 8.6; 5 TABLET, FILM COATED ORAL at 18:29

## 2021-03-26 RX ADMIN — THERA TABS 1 TABLET: TAB at 05:33

## 2021-03-26 RX ADMIN — ENOXAPARIN SODIUM 40 MG: 40 INJECTION SUBCUTANEOUS at 05:31

## 2021-03-26 RX ADMIN — POTASSIUM CHLORIDE 20 MEQ: 1500 TABLET, EXTENDED RELEASE ORAL at 05:33

## 2021-03-26 RX ADMIN — POTASSIUM CHLORIDE 20 MEQ: 1500 TABLET, EXTENDED RELEASE ORAL at 18:29

## 2021-03-26 RX ADMIN — Medication 100 MG: at 05:33

## 2021-03-26 ASSESSMENT — LIFESTYLE VARIABLES
AUDITORY DISTURBANCES: NOT PRESENT
TOTAL SCORE: 7
VISUAL DISTURBANCES: NOT PRESENT
AGITATION: SOMEWHAT MORE THAN NORMAL ACTIVITY
VISUAL DISTURBANCES: NOT PRESENT
ORIENTATION AND CLOUDING OF SENSORIUM: CANNOT DO SERIAL ADDITIONS OR IS UNCERTAIN ABOUT DATE
TOTAL SCORE: 6
HEADACHE, FULLNESS IN HEAD: NOT PRESENT
AUDITORY DISTURBANCES: NOT PRESENT
TOTAL SCORE: 6
AUDITORY DISTURBANCES: NOT PRESENT
NAUSEA AND VOMITING: NO NAUSEA AND NO VOMITING
TOTAL SCORE: 10
ANXIETY: *
ANXIETY: *
TREMOR: *
VISUAL DISTURBANCES: NOT PRESENT
TREMOR: MODERATE TREMOR WITH ARMS EXTENDED
HEADACHE, FULLNESS IN HEAD: NOT PRESENT
HEADACHE, FULLNESS IN HEAD: NOT PRESENT
PAROXYSMAL SWEATS: NO SWEAT VISIBLE
AGITATION: SOMEWHAT MORE THAN NORMAL ACTIVITY
ORIENTATION AND CLOUDING OF SENSORIUM: ORIENTED AND CAN DO SERIAL ADDITIONS
PAROXYSMAL SWEATS: NO SWEAT VISIBLE
PAROXYSMAL SWEATS: NO SWEAT VISIBLE
ORIENTATION AND CLOUDING OF SENSORIUM: ORIENTED AND CAN DO SERIAL ADDITIONS
AGITATION: *
HEADACHE, FULLNESS IN HEAD: NOT PRESENT
VISUAL DISTURBANCES: NOT PRESENT
NAUSEA AND VOMITING: NO NAUSEA AND NO VOMITING
PAROXYSMAL SWEATS: NO SWEAT VISIBLE
PAROXYSMAL SWEATS: NO SWEAT VISIBLE
AGITATION: SOMEWHAT MORE THAN NORMAL ACTIVITY
SUBSTANCE_ABUSE: 1
AGITATION: SOMEWHAT MORE THAN NORMAL ACTIVITY
ORIENTATION AND CLOUDING OF SENSORIUM: CANNOT DO SERIAL ADDITIONS OR IS UNCERTAIN ABOUT DATE
TREMOR: *
ORIENTATION AND CLOUDING OF SENSORIUM: ORIENTED AND CAN DO SERIAL ADDITIONS
NAUSEA AND VOMITING: NO NAUSEA AND NO VOMITING
HEADACHE, FULLNESS IN HEAD: NOT PRESENT
VISUAL DISTURBANCES: NOT PRESENT
HEADACHE, FULLNESS IN HEAD: NOT PRESENT
TOTAL SCORE: 6
NAUSEA AND VOMITING: NO NAUSEA AND NO VOMITING
AUDITORY DISTURBANCES: NOT PRESENT
PAROXYSMAL SWEATS: NO SWEAT VISIBLE
AUDITORY DISTURBANCES: NOT PRESENT
TREMOR: MODERATE TREMOR WITH ARMS EXTENDED
TREMOR: *
AUDITORY DISTURBANCES: NOT PRESENT
ANXIETY: *
PAROXYSMAL SWEATS: BARELY PERCEPTIBLE SWEATING. PALMS MOIST
AGITATION: *
ORIENTATION AND CLOUDING OF SENSORIUM: DATE DISORIENTATION BY NO MORE THAN TWO CALENDAR DAYS
TOTAL SCORE: 7
TOTAL SCORE: 5
TREMOR: *
NAUSEA AND VOMITING: NO NAUSEA AND NO VOMITING
ANXIETY: MILDLY ANXIOUS
AGITATION: NORMAL ACTIVITY
ANXIETY: MILDLY ANXIOUS
NAUSEA AND VOMITING: NO NAUSEA AND NO VOMITING
ANXIETY: MILDLY ANXIOUS
ORIENTATION AND CLOUDING OF SENSORIUM: ORIENTED AND CAN DO SERIAL ADDITIONS
AUDITORY DISTURBANCES: NOT PRESENT
TREMOR: *
HEADACHE, FULLNESS IN HEAD: NOT PRESENT
NAUSEA AND VOMITING: NO NAUSEA AND NO VOMITING
ANXIETY: MILDLY ANXIOUS

## 2021-03-26 ASSESSMENT — ENCOUNTER SYMPTOMS
COUGH: 0
BLURRED VISION: 0
TREMORS: 1
MYALGIAS: 1
SPUTUM PRODUCTION: 0
FEVER: 0
VOMITING: 0
HEMOPTYSIS: 0
NAUSEA: 0
DOUBLE VISION: 0
PALPITATIONS: 0
HEARTBURN: 0
DIARRHEA: 0
SHORTNESS OF BREATH: 0
ABDOMINAL PAIN: 0
WEAKNESS: 1

## 2021-03-26 ASSESSMENT — COGNITIVE AND FUNCTIONAL STATUS - GENERAL
WALKING IN HOSPITAL ROOM: A LITTLE
MOVING FROM LYING ON BACK TO SITTING ON SIDE OF FLAT BED: A LITTLE
MOBILITY SCORE: 20
SUGGESTED CMS G CODE MODIFIER DAILY ACTIVITY: CK
PERSONAL GROOMING: A LITTLE
DAILY ACTIVITIY SCORE: 18
SUGGESTED CMS G CODE MODIFIER MOBILITY: CJ
STANDING UP FROM CHAIR USING ARMS: A LITTLE
EATING MEALS: A LITTLE
DRESSING REGULAR UPPER BODY CLOTHING: A LITTLE
TOILETING: A LITTLE
HELP NEEDED FOR BATHING: A LITTLE
DRESSING REGULAR LOWER BODY CLOTHING: A LITTLE
CLIMB 3 TO 5 STEPS WITH RAILING: A LITTLE

## 2021-03-26 ASSESSMENT — ACTIVITIES OF DAILY LIVING (ADL): TOILETING: INDEPENDENT

## 2021-03-26 ASSESSMENT — GAIT ASSESSMENTS
DEVIATION: ATAXIC
GAIT LEVEL OF ASSIST: MINIMAL ASSIST
DISTANCE (FEET): 125
ASSISTIVE DEVICE: FRONT WHEEL WALKER

## 2021-03-26 ASSESSMENT — PAIN DESCRIPTION - PAIN TYPE: TYPE: ACUTE PAIN

## 2021-03-26 NOTE — PROGRESS NOTES
Assumed care at 0645. Patient in bed, sitter at bed side. Patient disoriented to place. CIWA 7. Ativan 0.5 mg given. Restraints removed prior to shift change. Three side rails up. Patient currently on 2L NC with SpO2 of 96%. Patient is high risk for falls. Yellow wrist band and yellow socks present. Signage on door. Will continue to monitor for additional needs.

## 2021-03-26 NOTE — CARE PLAN
Problem: Communication  Goal: The ability to communicate needs accurately and effectively will improve  Note: Room close to nursing station. Hourly rounding in place. Encouraged to voice needs. Reoriented frequently. CIWA assessments in place.     Problem: Safety  Goal: Will remain free from falls  Note: Sitter at bedside. Bed alarm in place. Hourly rounding in place.

## 2021-03-26 NOTE — PROGRESS NOTES
Report received from Elijah RN, assumed care of pt. Plan of care discussed with pt, labs and chart reviewed. All needs met at this time. Tele box on. Call light within reach, bed locked and in lowest position. All fall precautions and hourly rounding in place. Will continue to monitor.

## 2021-03-26 NOTE — CARE PLAN
Problem: Nutritional:  Goal: Achieve adequate nutritional intake  Description: Patient will consume >50% of meals/supplements  Outcome: PROGRESSING AS EXPECTED     PO % x 2 meals and % x 1 supplement per flow sheet since 3/23.    Will continue to send Boost Plus daily.    RN/CNA to please document intake of all meals as % taken in ADL's to provide interdisciplinary communication across all shifts.

## 2021-03-26 NOTE — THERAPY
"Occupational Therapy   Initial Evaluation     Patient Name: Marilynn Mary  Age:  60 y.o., Sex:  female  Medical Record #: [id number]  Today's Date: 3/26/2021     Precautions  Precautions: (P) Fall Risk    Assessment  Patient is 60 y.o. female admitted for seizure due to ETOH withdrawal, ETOH abuse, N/V and diarrhea, alcoholic hepatitis. Pt normally independent with all ADLs and ambulating without an AD at baseline, working, driving and living in a [facility] with spouse. Pt unsteady with ambulation requiring Lucrecia and a FWW but mostly supervision for ADLs, will continue to see for skilled therapy while admitted with anticipation that patient will have no skilled needs at discharge.    Plan    Recommend Occupational Therapy 3 times per week until therapy goals are met for the following treatments:  Adaptive Equipment, Self Care/Activities of Daily Living, Therapeutic Activities and Therapeutic Exercises.    DC Equipment Recommendations: (P) Unable to determine at this time  Discharge Recommendations: (P) Anticipate that the patient will have no further occupational therapy needs after discharge from the hospital     Subjective    \"I already did that today\"     Objective       03/26/21 1150   Prior Living Situation   Prior Services None   Housing / Facility 1 Story House   Steps Into Home 0   Steps In Home 0   Elevator No   Bathroom Set up Walk In Shower;Grab Bars   Equipment Owned Grab Bar(s) By Toilet   Lives with - Patient's Self Care Capacity Spouse   Prior Level of ADL Function   Self Feeding Independent   Grooming / Hygiene Independent   Bathing Independent   Dressing Independent   Toileting Independent   Prior Level of IADL Function   Medication Management Independent   Laundry Independent   Kitchen Mobility Independent   Finances Independent   Home Management Independent   Shopping Independent   Prior Level Of Mobility Independent Without Device in Community   Driving / Transportation Driving Independent   Occupation " (Pre-Hospital Vocational) Employed Full Time   History of Falls   History of Falls Yes   Precautions   Precautions Fall Risk   Pain 0 - 10 Group   Therapist Pain Assessment Post Activity Pain Same as Prior to Activity;Nurse Notified;0   Cognition    Cognition / Consciousness X   Speech/ Communication Delayed Responses   Level of Consciousness Alert   Safety Awareness Impaired;Impulsive   Attention Impaired   Active ROM Upper Body   Active ROM Upper Body  WDL   Dominant Hand Right   Strength Upper Body   Upper Body Strength  WDL   Sensation Upper Body   Upper Extremity Sensation  WDL   Upper Body Muscle Tone   Upper Body Muscle Tone  WDL   Neurological Concerns   Neurological Concerns Yes   Standing Posture During ADL's   (unsteady with standing)   Coordination Upper Body   Coordination X   Fine Motor Coordination tremorous   Balance Assessment   Sitting Balance (Static) Fair   Sitting Balance (Dynamic) Fair -   Standing Balance (Static) Poor +   Standing Balance (Dynamic) Poor +   Weight Shift Sitting Fair   Weight Shift Standing Fair   Comments w/ FWW   Bed Mobility    Supine to Sit Supervised   Sit to Supine Supervised   Scooting Supervised   Rolling Supervised   ADL Assessment   Grooming Minimal Assist;Standing   Upper Body Dressing Supervision   Lower Body Dressing Minimal Assist   Toileting Supervision   How much help from another person does the patient currently need...   Putting on and taking off regular lower body clothing? 3   Bathing (including washing, rinsing, and drying)? 3   Toileting, which includes using a toilet, bedpan, or urinal? 3   Putting on and taking off regular upper body clothing? 3   Taking care of personal grooming such as brushing teeth? 3   Eating meals? 3   6 Clicks Daily Activity Score 18   Functional Mobility   Sit to Stand Minimal Assist   Bed, Chair, Wheelchair Transfer Minimal Assist   Toilet Transfers Minimal Assist   Transfer Method Stand Step   Mobility bed mobility, hallway  mobility, bathroom mobility, back to bed   Comments w/ FWW   Visual Perception   Visual Perception  WDL   Activity Tolerance   Sitting in Chair 5  (toilet)   Sitting Edge of Bed 5   Standing 5   Patient / Family Goals   Patient / Family Goal #1 To go home   Short Term Goals   Short Term Goal # 1 Pt will complete ADL transfers with supervision   Short Term Goal # 2 Pt will complete standing G/H with supervision   Short Term Goal # 3 Pt will complete LB dressing with supervision   Education Group   Education Provided Role of Occupational Therapist   Role of Occupational Therapist Patient Response Patient;Acceptance;Explanation   Problem List   Problem List Decreased Active Daily Living Skills;Decreased Homemaking Skills;Decreased Functional Mobility;Decreased Activity Tolerance;Impaired Postural Control / Balance   Interdisciplinary Plan of Care Collaboration   IDT Collaboration with  Nursing   Patient Position at End of Therapy In Bed;Bed Alarm On;Call Light within Reach;Tray Table within Reach;Phone within Reach;Other (Comments)  (sitter present)   Collaboration Comments RN updated

## 2021-03-26 NOTE — THERAPY
Physical Therapy   Initial Evaluation     Patient Name: Marilynn Mary  Age:  60 y.o., Sex:  female  Medical Record #: [id number]  Today's Date: 3/26/2021     Precautions: Fall Risk    Assessment  60 y.o. female with a history of alcohol abuse in the past, questionable hypothyroidism (not on any medications), neuropathy who presented 3/23/2021 with 3 days of nausea and vomiting after stopping alcohol use 3 days ago...  Additional factors influencing patient status / progress: Today, pt is ataxic with ambulation, but improves with use of FWW.  Pt is supervised for OOB, min A transfers and Lucrecia ambulation using FWW x 125 feet. Pt lives with spouse who is being treated for CA. PT to follow to address problems noted below.     Plan    Recommend Physical Therapy 3 times per week until therapy goals are met for the following treatments:  Bed Mobility, Gait Training, Neuro Re-Education / Balance and Therapeutic Activities    DC Equipment Recommendations: Front-Wheel Walker  Discharge Recommendations: Recommend home health for continued physical therapy services          Objective       03/26/21 1135   Prior Living Situation   Prior Services None   Housing / Facility 1 Story House   Steps Into Home 0   Steps In Home 0   Elevator No   Equipment Owned None   Lives with - Patient's Self Care Capacity Spouse  (undergoing treatment for CA)   Prior Level of Functional Mobility   Bed Mobility Independent   Transfer Status Independent   Ambulation Independent   Distance Ambulation (Feet)   (community, works full time)   Assistive Devices Used None   Stairs Independent   Cognition    Cognition / Consciousness X   Level of Consciousness Alert   Safety Awareness Impulsive;Impaired   Gait Analysis   Gait Level Of Assist Minimal Assist   Assistive Device Front Wheel Walker   Distance (Feet) 125   Bed Mobility    Supine to Sit Supervised   Sit to Supine Supervised   Scooting Supervised   Rolling Supervised   Functional Mobility   Sit to  Stand Minimal Assist   Bed, Chair, Wheelchair Transfer Minimal Assist   Short Term Goals    Short Term Goal # 1 Pt will perform bed mobility with mod indep in 6 ivists.   Short Term Goal # 2 Pt will transfer with mod indep in 6 vitis to improve functional indep.    Short Term Goal # 3 Pt will ambulate x 200 feet using no AD with sueprvision in 6 visits to improve functional indep.    Problem List    Problems Impaired Ambulation;Impaired Balance;Safety Awareness Deficits / Cognition   Anticipated Discharge Equipment and Recommendations   DC Equipment Recommendations Front-Wheel Walker   Discharge Recommendations Recommend home health for continued physical therapy services

## 2021-03-26 NOTE — PROGRESS NOTES
"Hospital Medicine Daily Progress Note    Date of Service  3/26/2021    Chief Complaint  60 y.o. female admitted 3/23/2021 with Alcohol withdraw    Hospital Course  60 y.o. female with a history of alcohol abuse in the past, questionable hypothyroidism (not on any medications), neuropathy who presented 3/23/2021 with 3 days of nausea and vomiting after stopping alcohol use 3 days ago.  While in the waiting room in the emergency room she had a grand mal seizure and was given benzodiazepine.  Patient states she has only been drinking to glasses of wine nightly.  She is currently alert and oriented states having seizures in the past secondary to alcohol withdrawals states she was drinking vodka in the higher quantities at that time.  She states she decided to quit drinking alcohol 3 days ago just because \"it was time to quit.\"  She denies any significant abdominal pain she has had liquid stools with urgency but has been able to control it.  She denies any blood or dark black tarry stools.  States that she has been unable to hold down any food but has been able to drink some fluids.  She denies any drug use.    She was treated with alcohol protocol, electrolytes corrected and closely monitored for her withdraw symptom.          Interval Problem Update  Going thru etoh wd    ciwa 7    Tremulous    Weak    Case d/w patient and       Consultants/Specialty  none    Code Status  Full Code    Disposition  home    Review of Systems  Review of Systems   Constitutional: Positive for malaise/fatigue. Negative for fever.   HENT: Positive for tinnitus. Negative for ear discharge, hearing loss and nosebleeds.    Eyes: Negative for blurred vision and double vision.   Respiratory: Negative for cough, hemoptysis, sputum production and shortness of breath.    Cardiovascular: Negative for chest pain and palpitations.   Gastrointestinal: Negative for abdominal pain, diarrhea, heartburn, nausea and vomiting.   Genitourinary: Negative " for dysuria, frequency, hematuria and urgency.   Musculoskeletal: Positive for myalgias.   Neurological: Positive for tremors and weakness.   Psychiatric/Behavioral: Positive for substance abuse.        Physical Exam  Temp:  [36.1 °C (96.9 °F)-36.8 °C (98.3 °F)] 36.1 °C (96.9 °F)  Pulse:  [62-97] 89  Resp:  [16-17] 17  BP: (108-130)/(65-83) 113/83  SpO2:  [97 %-99 %] 98 %    Physical Exam  Vitals reviewed.   Constitutional:       General: She is not in acute distress.     Appearance: Normal appearance. She is normal weight. She is not diaphoretic.   HENT:      Head: Normocephalic and atraumatic.      Right Ear: External ear normal.      Left Ear: External ear normal.      Nose: Nose normal.      Mouth/Throat:      Pharynx: Oropharynx is clear.   Eyes:      General:         Right eye: No discharge.         Left eye: No discharge.      Extraocular Movements: Extraocular movements intact.      Conjunctiva/sclera: Conjunctivae normal.      Pupils: Pupils are equal, round, and reactive to light.   Cardiovascular:      Rate and Rhythm: Normal rate and regular rhythm.      Pulses: Normal pulses.      Heart sounds: Normal heart sounds.   Pulmonary:      Effort: Pulmonary effort is normal. No respiratory distress.      Breath sounds: Rhonchi (mild. bilateral) present. No wheezing.   Abdominal:      General: Abdomen is flat. Bowel sounds are normal. There is no distension.      Palpations: Abdomen is soft.      Tenderness: There is no abdominal tenderness. There is no right CVA tenderness, left CVA tenderness, guarding or rebound.   Musculoskeletal:         General: No swelling, tenderness or deformity. Normal range of motion.      Cervical back: Normal range of motion. No rigidity. No muscular tenderness.      Right lower leg: No edema.      Left lower leg: No edema.   Skin:     General: Skin is warm and dry.   Neurological:      General: No focal deficit present.      Mental Status: Mental status is at baseline.       Cranial Nerves: No cranial nerve deficit.      Sensory: No sensory deficit.      Motor: Weakness present.      Gait: Gait normal.      Deep Tendon Reflexes: Reflexes normal.      Comments: tremulous   Psychiatric:         Mood and Affect: Mood normal.         Behavior: Behavior normal.         Judgment: Judgment normal.         Fluids    Intake/Output Summary (Last 24 hours) at 3/26/2021 1203  Last data filed at 3/25/2021 1743  Gross per 24 hour   Intake 240 ml   Output 300 ml   Net -60 ml       Laboratory  Recent Labs     03/24/21  0232 03/25/21  0206 03/26/21  0914   WBC 3.2* 3.6* 6.5   RBC 3.51* 3.50* 3.56*   HEMOGLOBIN 13.0 13.2 13.4   HEMATOCRIT 39.5 39.8 41.3   .5* 113.7* 116.0*   MCH 37.0* 37.7* 37.6*   MCHC 32.9* 33.2* 32.4*   RDW 52.4* 52.8* 53.3*   PLATELETCT 77* 87* 111*   MPV 10.3 10.2 9.3     Recent Labs     03/24/21  0232 03/25/21  0206 03/26/21  0914   SODIUM 135 141 132*   POTASSIUM 4.4 4.2 4.2   CHLORIDE 97 106 98   CO2 30 21 17*   GLUCOSE 85 72 79   BUN 5* 3* 4*   CREATININE 0.44* 0.30* 0.37*   CALCIUM 8.6 8.2* 8.5     Recent Labs     03/23/21  1534   APTT 25.6   INR 1.03               Imaging  No orders to display        Assessment/Plan  * Seizure due to alcohol withdrawal, uncomplicated (HCC)- (present on admission)  Assessment & Plan  Seizure precautions   on alcohol withdrawal protocol  Correct electrolytes    Alcohol abuse- (present on admission)  Assessment & Plan     continue with CIWA protocol  Seizure precautions    Nausea vomiting and diarrhea- (present on admission)  Assessment & Plan  ivf     Alcoholic hepatitis- (present on admission)  Assessment & Plan  Elevated liver enzymes secondary to alcohol abuse    Alcohol cessation counseling- (present on admission)  Assessment & Plan  Dicussed about side effect of alcohol drinking  Encouraged alcohol cessation   Agree on quitting    Thrombocytopenia (HCC)- (present on admission)  Assessment & Plan  Likely secondary to ongoing alcohol  use pulmonary toxicity from alcohol  Monitor CBC    Abnormal LFTs (liver function tests)- (present on admission)  Assessment & Plan  Monitor CMP  Patient denies any significant acetaminophen use  Alcohol use      Hypomagnesemia- (present on admission)  Assessment & Plan  Replaced    Hypothyroidism- (present on admission)  Assessment & Plan  Per chart history of hypothyroid but patient denies being on any medications.   TSH elevated  T4 normal  followup with PCP.    Macrocytosis without anemia- (present on admission)  Assessment & Plan    Thiamine and folate replacement  Multi-vitamin with minerals       VTE prophylaxis: lovenox      Check am cbc, cmp

## 2021-03-26 NOTE — PROGRESS NOTES
Patient attempting to leave against medical advice. Dr. Vega notified. Patient is A&O x4, but CIWA score is 16 and patient is unable to ambulate due to unsteadiness on her feet. Thus, per Dr. Vega, patient is not currently capacitated to leave AMA. Will continue with plan of care.  Kavon zheng.

## 2021-03-27 VITALS
TEMPERATURE: 97.8 F | SYSTOLIC BLOOD PRESSURE: 132 MMHG | OXYGEN SATURATION: 95 % | WEIGHT: 150.13 LBS | BODY MASS INDEX: 27.63 KG/M2 | DIASTOLIC BLOOD PRESSURE: 93 MMHG | HEIGHT: 62 IN | RESPIRATION RATE: 18 BRPM | HEART RATE: 107 BPM

## 2021-03-27 PROBLEM — R19.7 NAUSEA VOMITING AND DIARRHEA: Status: RESOLVED | Noted: 2021-03-23 | Resolved: 2021-03-27

## 2021-03-27 PROBLEM — F10.930 SEIZURE DUE TO ALCOHOL WITHDRAWAL, UNCOMPLICATED (HCC): Status: RESOLVED | Noted: 2021-03-23 | Resolved: 2021-03-27

## 2021-03-27 PROBLEM — R11.2 NAUSEA VOMITING AND DIARRHEA: Status: RESOLVED | Noted: 2021-03-23 | Resolved: 2021-03-27

## 2021-03-27 PROBLEM — R56.9 SEIZURE DUE TO ALCOHOL WITHDRAWAL, UNCOMPLICATED (HCC): Status: RESOLVED | Noted: 2021-03-23 | Resolved: 2021-03-27

## 2021-03-27 LAB
ALBUMIN SERPL BCP-MCNC: 3.6 G/DL (ref 3.2–4.9)
ALBUMIN/GLOB SERPL: 1.2 G/DL
ALP SERPL-CCNC: 92 U/L (ref 30–99)
ALT SERPL-CCNC: 30 U/L (ref 2–50)
ANION GAP SERPL CALC-SCNC: 10 MMOL/L (ref 7–16)
AST SERPL-CCNC: 47 U/L (ref 12–45)
BILIRUB SERPL-MCNC: 1.1 MG/DL (ref 0.1–1.5)
BUN SERPL-MCNC: 3 MG/DL (ref 8–22)
CALCIUM SERPL-MCNC: 9.1 MG/DL (ref 8.5–10.5)
CHLORIDE SERPL-SCNC: 101 MMOL/L (ref 96–112)
CO2 SERPL-SCNC: 25 MMOL/L (ref 20–33)
CREAT SERPL-MCNC: 0.35 MG/DL (ref 0.5–1.4)
ERYTHROCYTE [DISTWIDTH] IN BLOOD BY AUTOMATED COUNT: 52.2 FL (ref 35.9–50)
GLOBULIN SER CALC-MCNC: 2.9 G/DL (ref 1.9–3.5)
GLUCOSE SERPL-MCNC: 89 MG/DL (ref 65–99)
HCT VFR BLD AUTO: 41.2 % (ref 37–47)
HGB BLD-MCNC: 13.4 G/DL (ref 12–16)
MCH RBC QN AUTO: 36.8 PG (ref 27–33)
MCHC RBC AUTO-ENTMCNC: 32.5 G/DL (ref 33.6–35)
MCV RBC AUTO: 113.2 FL (ref 81.4–97.8)
PLATELET # BLD AUTO: 127 K/UL (ref 164–446)
PMV BLD AUTO: 9.9 FL (ref 9–12.9)
POTASSIUM SERPL-SCNC: 4.5 MMOL/L (ref 3.6–5.5)
PROT SERPL-MCNC: 6.5 G/DL (ref 6–8.2)
RBC # BLD AUTO: 3.64 M/UL (ref 4.2–5.4)
SODIUM SERPL-SCNC: 136 MMOL/L (ref 135–145)
WBC # BLD AUTO: 4.9 K/UL (ref 4.8–10.8)

## 2021-03-27 PROCEDURE — A9270 NON-COVERED ITEM OR SERVICE: HCPCS | Performed by: HOSPITALIST

## 2021-03-27 PROCEDURE — 99239 HOSP IP/OBS DSCHRG MGMT >30: CPT | Performed by: HOSPITALIST

## 2021-03-27 PROCEDURE — 700102 HCHG RX REV CODE 250 W/ 637 OVERRIDE(OP): Performed by: HOSPITALIST

## 2021-03-27 PROCEDURE — 36415 COLL VENOUS BLD VENIPUNCTURE: CPT

## 2021-03-27 PROCEDURE — 85027 COMPLETE CBC AUTOMATED: CPT

## 2021-03-27 PROCEDURE — 700111 HCHG RX REV CODE 636 W/ 250 OVERRIDE (IP): Performed by: HOSPITALIST

## 2021-03-27 PROCEDURE — 80053 COMPREHEN METABOLIC PANEL: CPT

## 2021-03-27 RX ORDER — LORAZEPAM 1 MG/1
1 TABLET ORAL EVERY 8 HOURS PRN
Qty: 21 TABLET | Refills: 0 | Status: SHIPPED | OUTPATIENT
Start: 2021-03-27 | End: 2021-04-03

## 2021-03-27 RX ORDER — LANOLIN ALCOHOL/MO/W.PET/CERES
100 CREAM (GRAM) TOPICAL DAILY
Qty: 30 TABLET | Refills: 0 | Status: SHIPPED | OUTPATIENT
Start: 2021-03-27

## 2021-03-27 RX ORDER — ONDANSETRON 4 MG/1
4 TABLET, ORALLY DISINTEGRATING ORAL EVERY 6 HOURS PRN
Qty: 12 TABLET | Refills: 0 | Status: SHIPPED | OUTPATIENT
Start: 2021-03-27 | End: 2021-03-30

## 2021-03-27 RX ADMIN — Medication 100 MG: at 05:19

## 2021-03-27 RX ADMIN — DOCUSATE SODIUM 50 MG AND SENNOSIDES 8.6 MG 2 TABLET: 8.6; 5 TABLET, FILM COATED ORAL at 05:19

## 2021-03-27 RX ADMIN — FOLIC ACID 1 MG: 1 TABLET ORAL at 05:19

## 2021-03-27 RX ADMIN — POTASSIUM CHLORIDE 20 MEQ: 1500 TABLET, EXTENDED RELEASE ORAL at 05:19

## 2021-03-27 RX ADMIN — THERA TABS 1 TABLET: TAB at 05:19

## 2021-03-27 RX ADMIN — ENOXAPARIN SODIUM 40 MG: 40 INJECTION SUBCUTANEOUS at 05:19

## 2021-03-27 ASSESSMENT — LIFESTYLE VARIABLES
TOTAL SCORE: 4
AGITATION: NORMAL ACTIVITY
TOTAL SCORE: 4
VISUAL DISTURBANCES: NOT PRESENT
ORIENTATION AND CLOUDING OF SENSORIUM: CANNOT DO SERIAL ADDITIONS OR IS UNCERTAIN ABOUT DATE
HEADACHE, FULLNESS IN HEAD: NOT PRESENT
ANXIETY: MILDLY ANXIOUS
ORIENTATION AND CLOUDING OF SENSORIUM: CANNOT DO SERIAL ADDITIONS OR IS UNCERTAIN ABOUT DATE
PAROXYSMAL SWEATS: NO SWEAT VISIBLE
AGITATION: NORMAL ACTIVITY
VISUAL DISTURBANCES: NOT PRESENT
PAROXYSMAL SWEATS: NO SWEAT VISIBLE
TREMOR: *
TREMOR: *
HEADACHE, FULLNESS IN HEAD: NOT PRESENT
AUDITORY DISTURBANCES: NOT PRESENT
NAUSEA AND VOMITING: NO NAUSEA AND NO VOMITING
NAUSEA AND VOMITING: NO NAUSEA AND NO VOMITING
AUDITORY DISTURBANCES: NOT PRESENT
ANXIETY: MILDLY ANXIOUS

## 2021-03-27 NOTE — PROGRESS NOTES
To whom it may concern,     Caren valentin(  64) was under our medical care at [facility] from 3/23 till 3/27/2021. She can return to work on . No restrictions.Please excuse her absence.              Questions?? [phone]      Her Primary care provider may extend this leave of absence.          Gallo Vega

## 2021-03-27 NOTE — PROGRESS NOTES
Pt became A&Ox4 and was able to convince this nurse that restraints were no longer necessary. Restraints removed at this time.

## 2021-03-27 NOTE — DISCHARGE INSTRUCTIONS
Discharge Instructions    Discharged to home by car with relative. Discharged via wheelchair, hospital escort: Yes.  Special equipment needed: Walker    Be sure to schedule a follow-up appointment with your primary care doctor or any specialists as instructed.     Discharge Plan:   Diet Plan: Discussed  Activity Level: Discussed  Confirmed Follow up Appointment: Patient to Call and Schedule Appointment  Confirmed Symptoms Management: Discussed  Medication Reconciliation Updated: No (Comments)  Influenza Vaccine Indication: Patient Refuses    I understand that a diet low in cholesterol, fat, and sodium is recommended for good health. Unless I have been given specific instructions below for another diet, I accept this instruction as my diet prescription.       Special Instructions: None    · Is patient discharged on Warfarin / Coumadin?   No     Depression / Suicide Risk    As you are discharged from this [facility], it is important to learn how to keep safe from harming yourself.    Recognize the warning signs:  · Abrupt changes in personality, positive or negative- including increase in energy   · Giving away possessions  · Change in eating patterns- significant weight changes-  positive or negative  · Change in sleeping patterns- unable to sleep or sleeping all the time   · Unwillingness or inability to communicate  · Depression  · Unusual sadness, discouragement and loneliness  · Talk of wanting to die  · Neglect of personal appearance   · Rebelliousness- reckless behavior  · Withdrawal from people/activities they love  · Confusion- inability to concentrate     If you or a loved one observes any of these behaviors or has concerns about self-harm, here's what you can do:  · Talk about it- your feelings and reasons for harming yourself  · Remove any means that you might use to hurt yourself (examples: pills, rope, extension cords, firearm)  · Get professional help from the community (Mental Health,  Substance Abuse, psychological counseling)  · Do not be alone:Call your Safe Contact- someone whom you trust who will be there for you.  · Call your local CRISIS HOTLINE 192-2296 or 776-449-2497  · Call your local Children's Mobile Crisis Response Team [place] (296) 535-5755 or www.[employer]  · Call the toll free National Suicide Prevention Hotlines   · National Suicide Prevention Lifeline 311-500-SSHT (3033)  · National Hope Line Network 800-SUICIDE (285-0366)      Alcohol Abuse and Dependence Information, Adult  Alcohol is a widely available drug. People drink alcohol in different amounts. People who drink alcohol very often and in large amounts often have problems during and after drinking. They may develop what is called an alcohol use disorder. There are two main types of alcohol use disorders:  · Alcohol abuse. This is when you use alcohol too much or too often. You may use alcohol to make yourself feel happy or to reduce stress. You may have a hard time setting a limit on the amount you drink.  · Alcohol dependence. This is when you use alcohol consistently for a period of time, and your body changes as a result. This can make it hard to stop drinking because you may start to feel sick or feel different when you do not use alcohol. These symptoms are known as withdrawal.  How can alcohol abuse and dependence affect me?  Alcohol abuse and dependence can have a negative effect on your life. Drinking too much can lead to addiction. You may feel like you need alcohol to function normally. You may drink alcohol before work in the morning, during the day, or as soon as you get home from work in the evening. These actions can result in:  · Poor work performance.  · Job loss.  · Financial problems.  · Car crashes or criminal charges from driving after drinking alcohol.  · Problems in your relationships with friends and family.  · Losing the trust and respect of coworkers, friends, and family.  Drinking  heavily over a long period of time can permanently damage your body and brain, and can cause lifelong health issues, such as:  · Damage to your liver or pancreas.  · Heart problems, high blood pressure, or stroke.  · Certain cancers.  · Decreased ability to fight infections.  · Brain or nerve damage.  · Depression.  · Early (premature) death.  If you are careless or you crave alcohol, it is easy to drink more than your body can handle (overdose). Alcohol overdose is a serious situation that requires hospitalization. It may lead to permanent injuries or death.  What can increase my risk?  · Having a family history of alcohol abuse.  · Having depression or other mental health conditions.  · Beginning to drink at an early age.  · Binge drinking often.  · Experiencing trauma, stress, and an unstable home life during childhood.  · Spending time with people who drink often.  What actions can I take to prevent or manage alcohol abuse and dependence?  · Do not drink alcohol if:  ? Your health care provider tells you not to drink.  ? You are pregnant, may be pregnant, or are planning to become pregnant.  · If you drink alcohol:  ? Limit how much you use to:  § 0-1 drink a day for women.  § 0-2 drinks a day for men.  ? Be aware of how much alcohol is in your drink. In the [place] one drink equals one 12 oz bottle of beer (355 mL), one 5 oz glass of wine (148 mL), or one 1½ oz glass of hard liquor (44 mL).  · Stop drinking if you have been drinking too much. This can be very hard to do if you are used to abusing alcohol. If you begin to have withdrawal symptoms, talk with your health care provider or a person that you trust. These symptoms may include anxiety, shaky hands, headache, nausea, sweating, or not being able to sleep.  · Choose to drink nonalcoholic beverages in social gatherings and places where there may be alcohol.  Activity  · Spend more time on activities that you enjoy that do not involve alcohol, like hobbies or  exercise.  · Find healthy ways to cope with stress, such as exercise, meditation, or spending time with people you care about.  General information  · Talk to your family, coworkers, and friends about supporting you in your efforts to stop drinking. If they drink, ask them not to drink around you. Spend more time with people who do not drink alcohol.  · If you think that you have an alcohol dependency problem:  ? Tell friends or family about your concerns.  ? Talk with your health care provider or another health professional about where to get help.  ? Work with a therapist and a chemical dependency counselor.  ? Consider joining a support group for people who struggle with alcohol abuse and dependence.  Where to find support    · Your health care provider.  · [employer]: www.[employer].org  Therapy and support groups  · Local treatment centers or chemical dependency counselors.  · Local AA groups in your community: www.aa.org  Where to find more information  · Centers for Disease Control and Prevention: www.cdc.gov  · National [place] on Alcohol Abuse and Alcoholism: www.niaaa.nih.gov  · Alcoholics Anonymous (AA): www.aa.org  Contact a health care provider if:  · You drank more or for longer than you intended on more than one occasion.  · You tried to stop drinking or to cut back on how much you drink, but you were not able to.  · You often drink to the point of vomiting or passing out.  · You want to drink so badly that you cannot think about anything else.  · You have problems in your life due to drinking, but you continue to drink.  · You keep drinking even though you feel anxious, depressed, or have experienced memory loss.  · You have stopped doing the things you used to enjoy in order to drink.  · You have to drink more than you used to in order to get the effect you want.  · You experience anxiety, sweating, nausea, shakiness, and trouble sleeping when you try to stop drinking.  Get help right away  if:  · You have thoughts about hurting yourself or others.  · You have serious withdrawal symptoms, including:  ? Confusion.  ? Racing heart.  ? High blood pressure.  ? Fever.  If you ever feel like you may hurt yourself or others, or have thoughts about taking your own life, get help right away. You can go to your nearest emergency department or call:  · Your local emergency services (911 in the [place]  · A suicide crisis helpline, such as the National Suicide Prevention Lifeline at 1-947.275.8682. This is open 24 hours a day.  Summary  · Alcohol abuse and dependence can have a negative effect on your life. Drinking too much or too often can lead to addiction.  · If you drink alcohol, limit how much you use.  · If you are having trouble keeping your drinking under control, find ways to change your behavior. Hobbies, calming activities, exercise, or support groups can help.  · If you feel you need help with changing your drinking habits, talk with your health care provider, a good friend, or a therapist, or go to an AA group.  This information is not intended to replace advice given to you by your health care provider. Make sure you discuss any questions you have with your health care provider.  Document Released: 12/12/2017 Document Revised: 04/07/2020 Document Reviewed: 02/25/2020  Elsevier Patient Education © 2020 [employer]

## 2021-03-27 NOTE — PROGRESS NOTES
Discharge instructions given to patient and  at bedside, verbalizes understanding and states plans for follow-up with PCP. New and home medication review, post-discharge activity level and worsening of symptoms needing follow-up care discussed. Telemetry monitor/IV cathlon removed. Patient discharged with  via wheelchair. Medications that were stored in pharmacy picked up and given to patient as well as walker and other belongings.

## 2021-03-28 NOTE — DISCHARGE SUMMARY
"Discharge Summary    CHIEF COMPLAINT ON ADMISSION  Chief Complaint   Patient presents with   • Sent from Urgent Care     seen there this morning. had lab work done, states she is dehydrated and considered she has hepatitis.    • Nausea/Vomiting/Diarrhea     x3-4 days.   • Abdominal Pain     upper abd pain x3-4 days.    • Back Pain     low back pain x3-4 days.    • Abnormal Labs     elevated liver enzymes.   • Alcohol Problem     last drink 3 days ago.        Reason for Admission  Sent by MD; Abnormal Labs     Admission Date  3/23/2021    CODE STATUS  Prior    HPI & HOSPITAL COURSE    60 y.o. female with a history of alcohol abuse in the past, questionable hypothyroidism (not on any medications), neuropathy who presented 3/23/2021 with 3 days of nausea and vomiting after stopping alcohol use 3 days ago.  While in the waiting room in the emergency room she had a grand mal seizure and was given benzodiazepine.  Patient states she has only been drinking to glasses of wine nightly.  She is currently alert and oriented states having seizures in the past secondary to alcohol withdrawals states she was drinking vodka in the higher quantities at that time.  She states she decided to quit drinking alcohol 3 days ago just because \"it was time to quit.\"  She denies any significant abdominal pain she has had liquid stools with urgency but has been able to control it.  She denies any blood or dark black tarry stools.  States that she has been unable to hold down any food but has been able to drink some fluids.  She denies any drug use.    She was treated with alcohol protocol, electrolytes corrected and closely monitored for her withdraw symptom.      no recurrence of seizure during her stay on tele floor. She was given IVF, ativan and PT/ot. She was stable for dc to home with  on 3/27.she should abstain from alcohol in the future.    Therefore, she is discharged in good and stable condition to home with close outpatient " follow-up.    The patient met 2-midnight criteria for an inpatient stay at the time of discharge.    Discharge Date  3/27/2021    FOLLOW UP ITEMS POST DISCHARGE    DISCHARGE DIAGNOSES  Principal Problem (Resolved):    Seizure due to alcohol withdrawal, uncomplicated (HCC) POA: Yes  Active Problems:    Alcohol abuse POA: Yes    Thrombocytopenia (HCC) POA: Yes    Alcohol cessation counseling POA: Yes    Macrocytosis without anemia POA: Yes    Hypothyroidism POA: Yes  Resolved Problems:    Alcoholic hepatitis POA: Yes    Nausea vomiting and diarrhea POA: Yes    Hypomagnesemia POA: Yes    Abnormal LFTs (liver function tests) POA: Yes      FOLLOW UP  No future appointments.  Mario Trent M.D.  [address] [facility] [address]  [phone]            MEDICATIONS ON DISCHARGE     Medication List      START taking these medications      Instructions   LORazepam 1 MG Tabs  Commonly known as: ATIVAN   Take 1 tablet by mouth every 8 hours as needed for Anxiety (tremors) for up to 7 days.  Dose: 1 mg     thiamine 100 MG tablet  Commonly known as: THIAMINE   Take 1 tablet by mouth every day.  Dose: 100 mg        CONTINUE taking these medications      Instructions   ondansetron 4 MG Tbdp  Commonly known as: Zofran ODT   Take 1 tablet by mouth every 6 hours as needed for Nausea for up to 12 doses.  Dose: 4 mg            Allergies  No Known Allergies    DIET  No orders of the defined types were placed in this encounter.      ACTIVITY  As tolerated.  Weight bearing as tolerated    CONSULTATIONS  none    PROCEDURES      LABORATORY  Lab Results   Component Value Date    SODIUM 136 03/27/2021    POTASSIUM 4.5 03/27/2021    CHLORIDE 101 03/27/2021    CO2 25 03/27/2021    GLUCOSE 89 03/27/2021    BUN 3 (L) 03/27/2021    CREATININE 0.35 (L) 03/27/2021        Lab Results   Component Value Date    WBC 4.9 03/27/2021    HEMOGLOBIN 13.4 03/27/2021    HEMATOCRIT 41.2 03/27/2021    PLATELETCT 127 (L) 03/27/2021        Total time of the  discharge process exceeds 42 minutes.

## 2023-01-31 NOTE — PROGRESS NOTES
Received report from day shift RN and assumed care of patient.   Pt is resting in bed, A&Ox4, on RA, VSS.   Assessment completed, POC discussed.   Denies pain or discomfort at this time.  Pt is currently on CIWA protocol; next scoring due at 2100.  IVF NS running @ 200 ml/hr.  Bed is in lowest, locked position, call bell and belongings are in reach.   Hourly rounding and safety precautions in place.   No further needs at this time.   105